# Patient Record
Sex: MALE | Race: WHITE | NOT HISPANIC OR LATINO | Employment: FULL TIME | URBAN - METROPOLITAN AREA
[De-identification: names, ages, dates, MRNs, and addresses within clinical notes are randomized per-mention and may not be internally consistent; named-entity substitution may affect disease eponyms.]

---

## 2021-08-03 ENCOUNTER — HISTORICAL (OUTPATIENT)
Dept: ADMINISTRATIVE | Facility: HOSPITAL | Age: 66
End: 2021-08-03

## 2021-08-03 LAB — SARS-COV-2 RNA RESP QL NAA+PROBE: POSITIVE

## 2021-08-10 ENCOUNTER — HISTORICAL (OUTPATIENT)
Dept: INFECTIOUS DISEASES | Facility: HOSPITAL | Age: 66
End: 2021-08-10

## 2022-04-11 ENCOUNTER — HISTORICAL (OUTPATIENT)
Dept: ADMINISTRATIVE | Facility: HOSPITAL | Age: 67
End: 2022-04-11

## 2022-04-29 VITALS
WEIGHT: 206.81 LBS | SYSTOLIC BLOOD PRESSURE: 176 MMHG | OXYGEN SATURATION: 98 % | HEIGHT: 61 IN | DIASTOLIC BLOOD PRESSURE: 76 MMHG | BODY MASS INDEX: 39.05 KG/M2

## 2022-05-05 NOTE — HISTORICAL [facility]
This is a historical note converted from Lisa. Formatting and pictures may have been removed.  Please reference Lisa for original formatting and attached multimedia. Infectious Diseases Infusion Visit Note  ?  HPI  ?  A 66 year old male seen today in the [facility] infusion clinic for infusion of Casirivimab / Imdevimab. ?Pt met criteria based on age 65 or older and hypertension.  Tested positive for COVID19 on 8/3/2021.  ?  Have discussed the Emergency Use Authorization of Casirivimab / Imdevimab at length with the patient today to include the limitations of authorized use in patients with COVID-19 as well as the potential benefits and the limitations of said benefit. ?Have also discussed the [facility] for the use of the unapproved products is only intended for the treatment of mild to moderate COVID19 in adults with COVID19 who weigh at least 40 kg who are at high risk for progressing to severe COVID19 and/or hospitalization. ?This high risk is defined as:  ?  * Older age (>/= 65)  * BMI > 25  * Pregnancy  * CKD  * DM  * Immunosuppressive Disease or immunosuppressant medications  * Cardiovascular disease (including congenital heart disease) or HTN  * Chronic lung diseases (ex: COPD, Asthma { moderate / severe, interstitial lung disease, cystic fibrosis, and pulmonary HTN)  * Sickle cell disease  * Neurodevelopmental disorders (ex: cerebral palsy) or other complex medical conditions (ex: genetic or metabolic syndromes and severe congenital anomalies)  * Having a medical-related technological dependence (ex: tracheostomy, gastrostomy, or positive pressure ventilation (not related to COVID)  * Other medical conditions or factors that could place an individual for being at high risk leading to progression of disease (additional risk-benefit situations to include, but not limited to, race or ethnicity)?  ?  Have discussed that these drugs must be administered via IV infusion in a supervised setting. ?  ?  Objective:  PE: ?VS  reviewed and stable, as documented in infusion chart  ?? ? ? ?Gen: ?AAOx3 in NAD  ?? ? ? ?HEENT: Atraumatic, normocephalic  ????????CVS: ?RRR no m/r/g noted  ?? ? ? ?C/L: ?Breathing unlabored, Lungs CTA bilateral, equal expansion of the chest wall  ?  Labs: ?Positive COVID 19 test 8/3/2021.  ?  Impression/Recommendations:  ?  COVID19  ?  - Have discussed at length with the patient regarding potential risks vs benefit of Casirivimab / Imdevimab infusion. ?[facility] fact sheet for patients, parents, and caregivers has been provided to pt. ?Alternative therapy reviewed. ?Patient is in agreement to receive infusion, plan for same today. ?Patient given aftercare instructions and number to call should issues or adverse effects arise. ??  ?

## 2022-05-31 ENCOUNTER — HOSPITAL ENCOUNTER (EMERGENCY)
Facility: HOSPITAL | Age: 67
Discharge: HOME | End: 2022-05-31
Attending: INTERNAL MEDICINE
Payer: COMMERCIAL

## 2022-05-31 ENCOUNTER — APPOINTMENT (OUTPATIENT)
Dept: RADIOLOGY | Facility: HOSPITAL | Age: 67
End: 2022-05-31
Attending: INTERNAL MEDICINE
Payer: COMMERCIAL

## 2022-05-31 VITALS
WEIGHT: 201 LBS | TEMPERATURE: 98 F | SYSTOLIC BLOOD PRESSURE: 146 MMHG | DIASTOLIC BLOOD PRESSURE: 65 MMHG | OXYGEN SATURATION: 92 % | RESPIRATION RATE: 18 BRPM | HEART RATE: 80 BPM | HEIGHT: 61 IN | BODY MASS INDEX: 37.95 KG/M2

## 2022-05-31 DIAGNOSIS — S16.1XXA STRAIN OF MUSCLE, FASCIA AND TENDON AT NECK LEVEL, INITIAL ENCOUNTER: ICD-10-CM
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
## 2022-05-31 PROCEDURE — 99284 EMERGENCY DEPT VISIT MOD MDM: CPT | Mod: 25

## 2022-05-31 PROCEDURE — 70450 CT HEAD/BRAIN W/O DYE: CPT | Mod: TC

## 2022-05-31 PROCEDURE — 25000003 PHARM REV CODE 250: Performed by: INTERNAL MEDICINE

## 2022-05-31 RX ORDER — HYDROCODONE BITARTRATE AND ACETAMINOPHEN 7.5; 325 MG/1; MG/1
1 TABLET ORAL EVERY 6 HOURS PRN
Qty: 20 TABLET | Refills: 0 | Status: ON HOLD | OUTPATIENT
Start: 2022-05-31 | End: 2022-10-21 | Stop reason: HOSPADM

## 2022-05-31 RX ORDER — AMLODIPINE BESYLATE 10 MG/1
10 TABLET ORAL DAILY
COMMUNITY
End: 2023-11-01 | Stop reason: SDUPTHER

## 2022-05-31 RX ORDER — LISINOPRIL 40 MG/1
40 TABLET ORAL DAILY
COMMUNITY
End: 2023-11-01 | Stop reason: SDUPTHER

## 2022-05-31 RX ORDER — TIZANIDINE HYDROCHLORIDE 4 MG/1
4 CAPSULE, GELATIN COATED ORAL EVERY 8 HOURS PRN
Qty: 21 CAPSULE | Refills: 0 | Status: ON HOLD | OUTPATIENT
Start: 2022-05-31 | End: 2022-10-21 | Stop reason: CLARIF

## 2022-05-31 RX ORDER — HYDROCODONE BITARTRATE AND ACETAMINOPHEN 7.5; 325 MG/1; MG/1
1 TABLET ORAL ONCE
Status: COMPLETED | OUTPATIENT
Start: 2022-05-31 | End: 2022-05-31

## 2022-05-31 RX ORDER — MELOXICAM 7.5 MG/1
7.5 TABLET ORAL DAILY
COMMUNITY
Start: 2022-04-05 | End: 2023-08-23

## 2022-05-31 RX ORDER — PREDNISONE 20 MG/1
20 TABLET ORAL DAILY
Qty: 5 TABLET | Refills: 0 | Status: SHIPPED | OUTPATIENT
Start: 2022-05-31 | End: 2022-06-05

## 2022-05-31 RX ADMIN — HYDROCODONE BITARTRATE AND ACETAMINOPHEN 1 TABLET: 7.5; 325 TABLET ORAL at 05:05

## 2022-05-31 NOTE — ED TRIAGE NOTES
Pt to er c/o pain between shoulder blades s/p fall. Pt states hit head when he fell, denies loc.   normal/supple

## 2022-05-31 NOTE — Clinical Note
"Arsenio "Reidhans Kang was seen and treated in our emergency department on 5/31/2022.  He may return to work on 06/04/2022.       If you have any questions or concerns, please don't hesitate to call.      Aramis Vasquez, DO"

## 2022-05-31 NOTE — ED PROVIDER NOTES
"     Source of History:  Patient, no limitations    Chief complaint:  Fall (Pt to er c/o pain between shoulder blades s/p fall. Pt states hit head when he fell, denies loc.)      HPI:  Arsenio Kang is a 67 y.o. male presenting with Fall (Pt to er c/o pain between shoulder blades s/p fall. Pt states hit head when he fell, denies loc.)       Patient says he tripped over object, fell forward hitting the top of his head where there is some swelling and erythema, felt "dazed" however no LOC, complains of neck pain, no weakness or tingling    Patient is here for evaluation after a fall. Patient reportedly was standing when he fell from standing. The accident occurred a few minutes ago. It is reported that the patient did not have LOC, was ambulatory on scene and did hit his head and was "dazed" also has neck pain. At this time he complains of neck pain.  Patient denies vomiting, numbness, tingling, incontinence and inability to ambulate. Symptoms are exacerbated by movement. Pre-hospital information: none significant. The patient has tried rest for his symptoms with no relief.         Review of Systems   Constitutional symptoms:  Negative except as documented in HPI.   Skin symptoms:  Negative except as documented in HPI.   Eye symptoms:  Negative except as documented in HPI.   ENMT symptoms:  Negative except as documented in HPI.   Respiratory symptoms:  Negative except as documented in HPI.   Cardiovascular symptoms:  Negative except as documented in HPI.   Gastrointestinal symptoms:  Negative except as documented in HPI.    Genitourinary symptoms:  Negative except as documented in HPI.   Musculoskeletal symptoms:  Negative except as documented in HPI.   Neurologic symptoms:  Negative except as documented in HPI.   Psychiatric symptoms:  Negative except as documented in HPI.   Allergy/immunologic symptoms:  Negative except as documented in HPI.             Additional review of systems information: All other systems " "reviewed and otherwise negative.      Review of patient's allergies indicates:  No Known Allergies    PMH:  As per HPI and below:    History reviewed. No pertinent past medical history.     History reviewed. No pertinent family history.    History reviewed. No pertinent surgical history.    Social History     Tobacco Use    Smoking status: Never Smoker    Smokeless tobacco: Never Used       There is no problem list on file for this patient.       Physical Exam:    BP (!) 146/65   Pulse 80   Temp 98.1 °F (36.7 °C)   Resp 18   Ht 5' 1" (1.549 m)   Wt 91.2 kg (201 lb)   SpO2 (!) 92%   BMI 37.98 kg/m²     Nursing note and vital signs reviewed.    General:  Alert, no acute distress.   Skin: Normal for Ethnic Origin, No cyanosis, contusion top of head  Eye: Vision unchanged, Pupils symmetric, No icterus   Cardiovascular:  Regular rate and rhythm, No edema  Chest Wall: No deformity, equal chest rise  Respiratory:  Lungs are clear to auscultation, respirations are non-labored.    Musculoskeletal:  No deformity, Normal perfusion to all extremities  Back: Painful ROM in neck and upper back area, questionable bony tenderness in lower c-spine  : No suprapubic pain, No CVA tenderness  Gastrointestinal:  Soft, Nontender, Non distended, Normal bowel sounds.    Neurological:  Alert and oriented to person, place, time, and situation, normal motor observed, normal speech observed, strength equal bilateral upper extremities, no sensory changes, ambulates without difficulty  Psychiatric:  Cooperative, appropriate mood & affect.        Labs that have been ordered have been independently reviewed and interpreted by myself.     Old Chart Reviewed.      Initial Impression/ Differential Dx:      MDM:      Reviewed Nurses Note.    Reviewed Pertinent old records.    Orders Placed This Encounter    CT Head Without Contrast    CT Cervical Spine Without Contrast    X-Ray Chest PA And Lateral    HYDROcodone-acetaminophen 7.5-325 mg " per tablet 1 tablet    predniSONE (DELTASONE) 20 MG tablet    HYDROcodone-acetaminophen (NORCO) 7.5-325 mg per tablet    tiZANidine 4 mg Cap                    Labs Reviewed - No data to display       CT Head Without Contrast   Final Result         1. No acute intracranial abnormality.   2. Age-related atrophy and chronic sequela of white matter microvascular disease.   3. Additional chronic secondary details discussed above.         Electronically signed by: Jaylen Razo   Date:    05/31/2022   Time:    16:41      CT Cervical Spine Without Contrast   Final Result      No acute fracture or malalignment identified.      Cervical spine degenerative disc disease and spondylosis level by level discussed above.         Electronically signed by: Chris Rocha   Date:    05/31/2022   Time:    16:35      X-Ray Chest PA And Lateral   Final Result      No acute cardiopulmonary process.         Electronically signed by: Janes Bermudez   Date:    05/31/2022   Time:    16:32           No visits with results within 1 Day(s) from this visit.   Latest known visit with results is:   No results found for any previous visit.       Imaging Results          CT Head Without Contrast (Final result)  Result time 05/31/22 16:41:31    Final result by Jaylen Razo MD (05/31/22 16:41:31)                 Impression:        1. No acute intracranial abnormality.  2. Age-related atrophy and chronic sequela of white matter microvascular disease.  3. Additional chronic secondary details discussed above.      Electronically signed by: Jaylen Razo  Date:    05/31/2022  Time:    16:41             Narrative:    EXAMINATION:  CT HEAD WITHOUT CONTRAST    CLINICAL HISTORY:  ; Head trauma, moderate-severe;    TECHNIQUE:  Axial CT images were acquired without the intravenous administration of iodine-based contrast media.  Multiplanar reconstructions were accomplished by a CT technologist at a separate workstation and pushed to PACS for physician  review.    Automated tube current modulation, weight-based exposure dosing, and/or iterative reconstruction technique utilized to reach lowest reasonably achievable exposure rate.    DLP: 902 mGy*cm    COMPARISON:  None available at the time of the initial interpretation.    FINDINGS:  Images were reviewed in subdural, brain, soft tissue, and bone windows.    Exam quality: Motion/streak artifact limits assessment of the posterior fossa.    Hemorrhage:No evidence of acute hyperattenuating blood products.    Parenchyma: There is diffuse white matter hypoattenuation, typical of chronic microvascular changes. No discrete mass, mass effect, or CT evidence of acute large vascular territory insult. Gray-white differentiation is preserved.    Midline shift: None.    Ventricles: Normal size and configuration. No hydrocephalus.    Extra-axial spaces: No masses or fluid collections.    Dural sinuses: No abnormal densities.    Sellar/Suprasellar structures: No abnormalities.    Skull base: Mastoid air cells are well aerated. No focal abnormality.    Scalp/Skull: No abnormalities.    Vasculature: No focally hyperdense artery. Scattered vascular calcifications are present.    Facial structures: Unremarkable.                                 CT Cervical Spine Without Contrast (Final result)  Result time 05/31/22 16:35:11    Final result by Chris Rocha MD (05/31/22 16:35:11)                 Impression:      No acute fracture or malalignment identified.    Cervical spine degenerative disc disease and spondylosis level by level discussed above.      Electronically signed by: Chris Rocha  Date:    05/31/2022  Time:    16:35             Narrative:    EXAMINATION:  CT CERVICAL SPINE WITHOUT CONTRAST    CLINICAL HISTORY:  Neck pain, recent trauma;    TECHNIQUE:  Sequential axial images were obtained of the cervical spine without contrast and the images were reformatted.    Dose length product was 269 mGycm. Approximated exposure  control was utilized to minimize radiation dose.    COMPARISON:  None available    FINDINGS:  Cervical vertebrae stature is preserved and alignment is unremarkable.  No acute fracture or malalignment identified.  There is no prevertebral soft tissue prominence.  Disc segmental analysis is given below:    At C2-C3, there is mild posterior vertebral spondylosis without central canal stenosis.  There is bilateral uncovertebral arthropathy without significant narrowings of the neural foramen.    At C3-C4, there is osteophyte disc complex which effaces the ventral subarachnoid space without cord compression.  There is bilateral uncovertebral arthropathy which results in bilateral marked narrowings of the neural foramen which is worse on the left.    At C4-C5, there is osteophyte disc complex which effaces the ventral subarachnoid space without apparent cord compression.  There is bilateral uncovertebral and to a lesser degree facet arthropathy.  This results in bilateral marked narrowings of the neural foramen.    At C5-C6, there is osteophyte disc complex which indents the ventral thecal sac without cord compression.  There are bilateral mild spondylotic narrowings of the neural foramen.    At C6-C7, there is osteophyte disc complex which indents the ventral thecal sac without cord compression.  There is mild narrowing the right neural foramen and moderate spondylotic narrowing of the left neural foramen.    At C7-T1, central canal is not stenosed.  There are no narrowings of the neural foramen.                               X-Ray Chest PA And Lateral (Final result)  Result time 05/31/22 16:32:17    Final result by Janes Bermudez MD (05/31/22 16:32:17)                 Impression:      No acute cardiopulmonary process.      Electronically signed by: Janes Bermudez  Date:    05/31/2022  Time:    16:32             Narrative:    EXAMINATION:  XR CHEST PA AND LATERAL    CLINICAL HISTORY:  fall;    TECHNIQUE:  PA and lateral views  of the chest.    COMPARISON:  None available.    FINDINGS:  The cardiomediastinal silhouette is normal in size and contour. Pulmonary vascularity is within normal limits. The lungs are well-inflated and are without focal consolidation, pleural effusion, or pneumothorax.    The imaged osseous structures and soft tissues are without acute abnormality.                                                                     Diagnostic Impression:    1. Fall from ground level    2. Strain of muscle, fascia and tendon at neck level, initial encounter         ED Disposition Condition    Discharge Stable           Follow-up Information     PCP In 1 week.                        ED Prescriptions     Medication Sig Dispense Start Date End Date Auth. Provider    predniSONE (DELTASONE) 20 MG tablet Take 1 tablet (20 mg total) by mouth once daily. for 5 days 5 tablet 5/31/2022 6/5/2022 Aramis Vasquez, DO    HYDROcodone-acetaminophen (NORCO) 7.5-325 mg per tablet Take 1 tablet by mouth every 6 (six) hours as needed for Pain. 20 tablet 5/31/2022  Aramis Vasquez DO    tiZANidine 4 mg Cap Take 4 mg by mouth every 8 (eight) hours as needed (muscle spasm). 21 capsule 5/31/2022  Aramis Vasquez DO        Follow-up Information     Follow up With Specialties Details Why Contact Info    PCP  In 1 week             Aramis Vasquez DO  05/31/22 0655

## 2022-09-22 DIAGNOSIS — M77.8 RIGHT SHOULDER TENDONITIS: Primary | ICD-10-CM

## 2022-09-26 ENCOUNTER — OFFICE VISIT (OUTPATIENT)
Dept: ORTHOPEDICS | Facility: CLINIC | Age: 67
End: 2022-09-26
Payer: COMMERCIAL

## 2022-09-26 ENCOUNTER — HOSPITAL ENCOUNTER (OUTPATIENT)
Dept: RADIOLOGY | Facility: CLINIC | Age: 67
Discharge: HOME | End: 2022-09-26
Attending: REHABILITATION UNIT
Payer: COMMERCIAL

## 2022-09-26 VITALS
WEIGHT: 197.19 LBS | DIASTOLIC BLOOD PRESSURE: 76 MMHG | HEIGHT: 61 IN | BODY MASS INDEX: 37.23 KG/M2 | HEART RATE: 82 BPM | SYSTOLIC BLOOD PRESSURE: 166 MMHG

## 2022-09-26 DIAGNOSIS — M75.121 COMPLETE TEAR OF RIGHT ROTATOR CUFF, UNSPECIFIED WHETHER TRAUMATIC: Primary | ICD-10-CM

## 2022-09-26 DIAGNOSIS — M25.511 RIGHT SHOULDER PAIN, UNSPECIFIED CHRONICITY: ICD-10-CM

## 2022-09-26 DIAGNOSIS — M77.8 RIGHT SHOULDER TENDONITIS: ICD-10-CM

## 2022-09-26 PROCEDURE — 1159F PR MEDICATION LIST DOCUMENTED IN MEDICAL RECORD: ICD-10-PCS | Mod: CPTII,,, | Performed by: REHABILITATION UNIT

## 2022-09-26 PROCEDURE — 73030 XR SHOULDER COMPLETE 2 OR MORE VIEWS RIGHT: ICD-10-PCS | Mod: RT,,, | Performed by: REHABILITATION UNIT

## 2022-09-26 PROCEDURE — 99214 PR OFFICE/OUTPT VISIT, EST, LEVL IV, 30-39 MIN: ICD-10-PCS | Mod: ,,, | Performed by: REHABILITATION UNIT

## 2022-09-26 PROCEDURE — 99214 OFFICE O/P EST MOD 30 MIN: CPT | Mod: ,,, | Performed by: REHABILITATION UNIT

## 2022-09-26 PROCEDURE — 3078F PR MOST RECENT DIASTOLIC BLOOD PRESSURE < 80 MM HG: ICD-10-PCS | Mod: CPTII,,, | Performed by: REHABILITATION UNIT

## 2022-09-26 PROCEDURE — 3008F BODY MASS INDEX DOCD: CPT | Mod: CPTII,,, | Performed by: REHABILITATION UNIT

## 2022-09-26 PROCEDURE — 73030 X-RAY EXAM OF SHOULDER: CPT | Mod: RT,,, | Performed by: REHABILITATION UNIT

## 2022-09-26 PROCEDURE — 1125F PR PAIN SEVERITY QUANTIFIED, PAIN PRESENT: ICD-10-PCS | Mod: CPTII,,, | Performed by: REHABILITATION UNIT

## 2022-09-26 PROCEDURE — 3077F PR MOST RECENT SYSTOLIC BLOOD PRESSURE >= 140 MM HG: ICD-10-PCS | Mod: CPTII,,, | Performed by: REHABILITATION UNIT

## 2022-09-26 PROCEDURE — 3288F PR FALLS RISK ASSESSMENT DOCUMENTED: ICD-10-PCS | Mod: CPTII,,, | Performed by: REHABILITATION UNIT

## 2022-09-26 PROCEDURE — 1101F PT FALLS ASSESS-DOCD LE1/YR: CPT | Mod: CPTII,,, | Performed by: REHABILITATION UNIT

## 2022-09-26 PROCEDURE — 1159F MED LIST DOCD IN RCRD: CPT | Mod: CPTII,,, | Performed by: REHABILITATION UNIT

## 2022-09-26 PROCEDURE — 3008F PR BODY MASS INDEX (BMI) DOCUMENTED: ICD-10-PCS | Mod: CPTII,,, | Performed by: REHABILITATION UNIT

## 2022-09-26 PROCEDURE — 1101F PR PT FALLS ASSESS DOC 0-1 FALLS W/OUT INJ PAST YR: ICD-10-PCS | Mod: CPTII,,, | Performed by: REHABILITATION UNIT

## 2022-09-26 PROCEDURE — 3078F DIAST BP <80 MM HG: CPT | Mod: CPTII,,, | Performed by: REHABILITATION UNIT

## 2022-09-26 PROCEDURE — 3288F FALL RISK ASSESSMENT DOCD: CPT | Mod: CPTII,,, | Performed by: REHABILITATION UNIT

## 2022-09-26 PROCEDURE — 1125F AMNT PAIN NOTED PAIN PRSNT: CPT | Mod: CPTII,,, | Performed by: REHABILITATION UNIT

## 2022-09-26 PROCEDURE — 3077F SYST BP >= 140 MM HG: CPT | Mod: CPTII,,, | Performed by: REHABILITATION UNIT

## 2022-09-26 RX ORDER — DICLOFENAC SODIUM 75 MG/1
75 TABLET, DELAYED RELEASE ORAL 2 TIMES DAILY
Status: ON HOLD | COMMUNITY
Start: 2022-08-17 | End: 2022-10-21 | Stop reason: HOSPADM

## 2022-09-26 NOTE — PROGRESS NOTES
Subjective:      Patient ID: Arsenio Kang is a 67 y.o. male.    Chief Complaint: Shoulder Pain (Pt states he has multiple tears in his shoulder, pt was diagnosed w/ tendonitis. Pt needs a second opinion of a specialist. Pt is current taking Norco.)    HPI:   Arsenio Kang is a 67 y.o. male who presents today for initial evaluation of his right shoulder.  He is right-hand dominant.  He reports that last month he developed severe right shoulder pain.  Prior to this he reports that he had occasional issues with bursitis but these were temporary and responded to medication.  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  He reports that he was manually moving some heavy weighted pallets very frequently and he had developed shoulder pain at work with these activities.  He has tried Aleve, diclofenac, and is Norco with minimal relief.  Pain is localized to the lateral aspect of the shoulder.  It is worse with certain movements such as overhead activities and associated with loss of motion and some weakness.  He also reports a numbness and tingling that extends down into the dorsal aspect of his forearm and hand. PCP ordered MRI and he is here to discuss further treatment options.       Past Medical History:   Diagnosis Date    Arthritis     Bursitis     Heart murmur     Hypertension      Past Surgical History:   Procedure Laterality Date    APPENDECTOMY      BACK SURGERY       Social History     Socioeconomic History    Marital status: Unknown   Tobacco Use    Smoking status: Never    Smokeless tobacco: Never         Current Outpatient Medications:     amLODIPine (NORVASC) 10 MG tablet, Take 10 mg by mouth once daily., Disp: , Rfl:     diclofenac (VOLTAREN) 75 MG EC tablet, Take 75 mg by mouth 2 (two) times daily., Disp: , Rfl:     HYDROcodone-acetaminophen (NORCO) 7.5-325 mg per tablet, Take 1 tablet by mouth every 6 (six) hours as needed for Pain., Disp: 20 tablet, Rfl: 0    lisinopriL  "(PRINIVIL,ZESTRIL) 40 MG tablet, Take 40 mg by mouth once daily., Disp: , Rfl:     meloxicam (MOBIC) 7.5 MG tablet, Take 7.5 mg by mouth once daily., Disp: , Rfl:     tiZANidine 4 mg Cap, Take 4 mg by mouth every 8 (eight) hours as needed (muscle spasm). (Patient not taking: Reported on 9/26/2022), Disp: 21 capsule, Rfl: 0  Review of patient's allergies indicates:  No Known Allergies    BP (!) 166/76   Pulse 82   Ht 5' 1" (1.549 m)   Wt 89.4 kg (197 lb 3.2 oz)   BMI 37.26 kg/m²     Comprehensive review of systems completed and negative except as per HPI.        Objective:   Head: Normocephalic, without obvious abnormality, atraumatic  Eyes: conjunctivae/corneas clear. EOM's intact  Ears: normal external appearance  Nose: Nares normal. Septum midline. Mucosa normal. No drainage  Throat: normal findings: lips normal without lesions  Lungs: unlabored breathing on room air  Chest wall: symmetric chest rise  Heart: regular rate and rhythm  Pulses: 2+ and symmetric  Skin: Skin color, texture, turgor normal. No rashes or lesions  Neurologic: Grossly normal    right SHOULDER    Appearance:   normal    Cervical Spine:   No ttp; full, painless ROM; negative Spurlings    Tenderness:   Diffuse     AROM:   , Abduction 120, ER 60, IR back pocket    PROM:  Same and limited by pain    Pain:  AROM: Positive  PROM:  Positive  End ROM: Positive  Supraspinatus strength testing: Positive  External rotation strength testing: Positive  Kaya-scapular: Negative  Virtually all provacative maneuvers Positive    Strength:  Supraspinatus: intact  External rotation: intact  Kaya-scapular: intact    Provocative Maneuvers:     Rotator Cuff/Biceps/AC Joint  Neer's Sign: Positive  Hawkin's Test: Positive  Painful arc: Positive  Belly Press: Negative  Bear Hugger Test: Negative  Hornblower's Sign: Negative  Speed's Test: Positive  Cross Arm Abduction: Positive    Pulses: Palpable radial pulse    Neurological deficits: None    The patient " has a warm and well-perfused upper extremity with capillary refill less than 2 seconds. Sensation is intact to light touch in terminal nerve distributions. 5/5 ain/pin/uln. The patient has no palpable epitrochlear lymphadenopathy.      Assessment:     Imaging:   Four view radiographs of the right shoulder obtained today personally reviewed showing no acute fractures or dislocations.  Mild AC and GH arthritis.  Humeral head remains seated centrally within the glenoid.    MRI R shoulder w/o 9/21/22    Impression:    1. Subacromial, subdeltoid, and subcoracoid bursal fluid  2. Complete supraspinatus tendon tear with tendon retraction to a medial humeral head level  3. Thin lateral subscapularis tendon.  Partial tearing is most likely.  This is difficult to characterize.    Number for infraspinatus tendinosis.    5. Partial, possibly complete, bicipital tendon tear.    6. Biceps labral complex degeneration versus partial tearing.  Labral degenerative changes.    7. Severe AC joint degenerative disease.  Mild-to-moderate glenohumeral joint degenerative disease.    MRI of the right shoulder personally reviewed showing full-thickness supraspinatus tear with retraction to the medial humeral head.  Tendinosis of the infraspinatus.  Bursitis.  Biceps tendinopathy.  Degenerative labrum.  Advanced AC joint arthritis.  Mild glenohumeral arthritis.        1. Complete tear of right rotator cuff, unspecified whether traumatic    2. Right shoulder pain, unspecified chronicity    3. Right shoulder tendonitis          Plan:       Orders Placed This Encounter    X-ray Shoulder 2 or More Views Right    X-Ray Chest PA And Lateral    CBC auto differential    Comprehensive metabolic panel    EKG 12-lead    Case Request Operating Room: REPAIR, ROTATOR CUFF, ARTHROSCOPIC      Patient has had previous right shoulder pain in the past that has flared up and ultimately got better medication and time.  He had an incident at work with an increase  in pain that has not improved with nonoperative modalities.  MRI results as above with full-thickness rotator cuff tear.  He has had persistent pain and loss of function that is interfering with his activities of daily living and his work. We discussed operative and nonoperative options. The patient had an opportunity to ask questions. All questions were answered. The risks and benefits of surgery were discussed with the patient, including but not limited to bleeding, infection, damage to surrounding nerves and structures, need for further surgery, repair failure, anesthesia risk, progression of arthritis, blood clots, and medical risks of surgery. The patient voiced understanding of the risks and benefits and provided written consent to the procedure. Plan for right shoulder arthroscopy with rotator cuff repair, biceps tenotomy versus tenodesis, and any indicated procedures. Patient will be scheduled for surgery at a mutually convenient date in the near future. He was fitted for his postoperative sling.

## 2022-10-03 ENCOUNTER — TELEPHONE (OUTPATIENT)
Dept: ORTHOPEDICS | Facility: CLINIC | Age: 67
End: 2022-10-03
Payer: COMMERCIAL

## 2022-10-09 RX ORDER — SODIUM CHLORIDE 9 MG/ML
INJECTION, SOLUTION INTRAVENOUS CONTINUOUS
Status: CANCELLED | OUTPATIENT
Start: 2022-10-09

## 2022-10-18 ENCOUNTER — ANESTHESIA EVENT (OUTPATIENT)
Dept: SURGERY | Facility: HOSPITAL | Age: 67
End: 2022-10-18
Payer: COMMERCIAL

## 2022-10-19 ENCOUNTER — TELEPHONE (OUTPATIENT)
Dept: PREADMISSION TESTING | Facility: HOSPITAL | Age: 67
End: 2022-10-19

## 2022-10-21 ENCOUNTER — ANESTHESIA (OUTPATIENT)
Dept: SURGERY | Facility: HOSPITAL | Age: 67
End: 2022-10-21
Payer: COMMERCIAL

## 2022-10-21 ENCOUNTER — HOSPITAL ENCOUNTER (OUTPATIENT)
Facility: HOSPITAL | Age: 67
Discharge: HOME | End: 2022-10-21
Attending: REHABILITATION UNIT | Admitting: REHABILITATION UNIT
Payer: COMMERCIAL

## 2022-10-21 VITALS
RESPIRATION RATE: 19 BRPM | HEART RATE: 99 BPM | SYSTOLIC BLOOD PRESSURE: 121 MMHG | WEIGHT: 206.13 LBS | OXYGEN SATURATION: 95 % | BODY MASS INDEX: 38.92 KG/M2 | HEIGHT: 61 IN | TEMPERATURE: 98 F | DIASTOLIC BLOOD PRESSURE: 88 MMHG

## 2022-10-21 DIAGNOSIS — M75.121 COMPLETE TEAR OF RIGHT ROTATOR CUFF, UNSPECIFIED WHETHER TRAUMATIC: ICD-10-CM

## 2022-10-21 DIAGNOSIS — M25.511 RIGHT SHOULDER PAIN, UNSPECIFIED CHRONICITY: ICD-10-CM

## 2022-10-21 DIAGNOSIS — Z41.9 SURGERY, ELECTIVE: ICD-10-CM

## 2022-10-21 LAB
ANION GAP SERPL CALC-SCNC: 9 MEQ/L
BASOPHILS # BLD AUTO: 0.05 X10(3)/MCL (ref 0–0.2)
BASOPHILS NFR BLD AUTO: 0.7 %
BUN SERPL-MCNC: 16.4 MG/DL (ref 8.4–25.7)
CALCIUM SERPL-MCNC: 10 MG/DL (ref 8.8–10)
CHLORIDE SERPL-SCNC: 105 MMOL/L (ref 98–107)
CO2 SERPL-SCNC: 24 MMOL/L (ref 23–31)
CREAT SERPL-MCNC: 0.83 MG/DL (ref 0.73–1.18)
CREAT/UREA NIT SERPL: 20
EOSINOPHIL # BLD AUTO: 0.24 X10(3)/MCL (ref 0–0.9)
EOSINOPHIL NFR BLD AUTO: 3.5 %
ERYTHROCYTE [DISTWIDTH] IN BLOOD BY AUTOMATED COUNT: 13.8 % (ref 11.5–17)
GFR SERPLBLD CREATININE-BSD FMLA CKD-EPI: >60 MLS/MIN/1.73/M2
GLUCOSE SERPL-MCNC: 94 MG/DL (ref 82–115)
HCT VFR BLD AUTO: 42.8 % (ref 42–52)
HGB BLD-MCNC: 13.9 GM/DL (ref 14–18)
IMM GRANULOCYTES # BLD AUTO: 0.02 X10(3)/MCL (ref 0–0.04)
IMM GRANULOCYTES NFR BLD AUTO: 0.3 %
LYMPHOCYTES # BLD AUTO: 2.9 X10(3)/MCL (ref 0.6–4.6)
LYMPHOCYTES NFR BLD AUTO: 42.5 %
MCH RBC QN AUTO: 27.6 PG (ref 27–31)
MCHC RBC AUTO-ENTMCNC: 32.5 MG/DL (ref 33–36)
MCV RBC AUTO: 84.9 FL (ref 80–94)
MONOCYTES # BLD AUTO: 0.69 X10(3)/MCL (ref 0.1–1.3)
MONOCYTES NFR BLD AUTO: 10.1 %
NEUTROPHILS # BLD AUTO: 2.9 X10(3)/MCL (ref 2.1–9.2)
NEUTROPHILS NFR BLD AUTO: 42.9 %
NRBC BLD AUTO-RTO: 0 %
PLATELET # BLD AUTO: 281 X10(3)/MCL (ref 130–400)
PMV BLD AUTO: 9.6 FL (ref 7.4–10.4)
POTASSIUM SERPL-SCNC: 4.1 MMOL/L (ref 3.5–5.1)
RBC # BLD AUTO: 5.04 X10(6)/MCL (ref 4.7–6.1)
SODIUM SERPL-SCNC: 138 MMOL/L (ref 136–145)
WBC # SPEC AUTO: 6.8 X10(3)/MCL (ref 4.5–11.5)

## 2022-10-21 PROCEDURE — 36415 COLL VENOUS BLD VENIPUNCTURE: CPT | Performed by: REHABILITATION UNIT

## 2022-10-21 PROCEDURE — 25000003 PHARM REV CODE 250: Performed by: NURSE ANESTHETIST, CERTIFIED REGISTERED

## 2022-10-21 PROCEDURE — C1713 ANCHOR/SCREW BN/BN,TIS/BN: HCPCS | Performed by: REHABILITATION UNIT

## 2022-10-21 PROCEDURE — 71000016 HC POSTOP RECOV ADDL HR: Performed by: REHABILITATION UNIT

## 2022-10-21 PROCEDURE — 71000033 HC RECOVERY, INTIAL HOUR: Performed by: REHABILITATION UNIT

## 2022-10-21 PROCEDURE — 80048 BASIC METABOLIC PNL TOTAL CA: CPT | Performed by: REHABILITATION UNIT

## 2022-10-21 PROCEDURE — C1889 IMPLANT/INSERT DEVICE, NOC: HCPCS | Performed by: REHABILITATION UNIT

## 2022-10-21 PROCEDURE — 27201423 OPTIME MED/SURG SUP & DEVICES STERILE SUPPLY: Performed by: REHABILITATION UNIT

## 2022-10-21 PROCEDURE — 37000008 HC ANESTHESIA 1ST 15 MINUTES: Performed by: REHABILITATION UNIT

## 2022-10-21 PROCEDURE — 25000003 PHARM REV CODE 250: Performed by: REHABILITATION UNIT

## 2022-10-21 PROCEDURE — 63600175 PHARM REV CODE 636 W HCPCS: Performed by: REHABILITATION UNIT

## 2022-10-21 PROCEDURE — 29823 SHO ARTHRS SRG XTNSV DBRDMT: CPT | Mod: 59,51,RT, | Performed by: REHABILITATION UNIT

## 2022-10-21 PROCEDURE — 63600175 PHARM REV CODE 636 W HCPCS: Performed by: STUDENT IN AN ORGANIZED HEALTH CARE EDUCATION/TRAINING PROGRAM

## 2022-10-21 PROCEDURE — 94640 AIRWAY INHALATION TREATMENT: CPT

## 2022-10-21 PROCEDURE — 37000009 HC ANESTHESIA EA ADD 15 MINS: Performed by: REHABILITATION UNIT

## 2022-10-21 PROCEDURE — 93005 ELECTROCARDIOGRAM TRACING: CPT | Mod: 59

## 2022-10-21 PROCEDURE — 36000711: Performed by: REHABILITATION UNIT

## 2022-10-21 PROCEDURE — 85025 COMPLETE CBC W/AUTO DIFF WBC: CPT | Performed by: REHABILITATION UNIT

## 2022-10-21 PROCEDURE — 93010 ELECTROCARDIOGRAM REPORT: CPT | Mod: ,,, | Performed by: INTERNAL MEDICINE

## 2022-10-21 PROCEDURE — 29827 SHO ARTHRS SRG RT8TR CUF RPR: CPT | Mod: RT,,, | Performed by: REHABILITATION UNIT

## 2022-10-21 PROCEDURE — 93010 EKG 12-LEAD: ICD-10-PCS | Mod: ,,, | Performed by: INTERNAL MEDICINE

## 2022-10-21 PROCEDURE — 94761 N-INVAS EAR/PLS OXIMETRY MLT: CPT

## 2022-10-21 PROCEDURE — 29827 PR SHLDR ARTHROSCOP,SURG,W/ROTAT CUFF REPR: ICD-10-PCS | Mod: RT,,, | Performed by: REHABILITATION UNIT

## 2022-10-21 PROCEDURE — 76942 ECHO GUIDE FOR BIOPSY: CPT | Performed by: STUDENT IN AN ORGANIZED HEALTH CARE EDUCATION/TRAINING PROGRAM

## 2022-10-21 PROCEDURE — 63600175 PHARM REV CODE 636 W HCPCS: Performed by: NURSE ANESTHETIST, CERTIFIED REGISTERED

## 2022-10-21 PROCEDURE — 36000710: Performed by: REHABILITATION UNIT

## 2022-10-21 PROCEDURE — 29823 PR SHLDR ARTHROSCOP,EXTEN DEBRIDE: ICD-10-PCS | Mod: 59,51,RT, | Performed by: REHABILITATION UNIT

## 2022-10-21 PROCEDURE — 71000015 HC POSTOP RECOV 1ST HR: Performed by: REHABILITATION UNIT

## 2022-10-21 PROCEDURE — 25000242 PHARM REV CODE 250 ALT 637 W/ HCPCS: Performed by: STUDENT IN AN ORGANIZED HEALTH CARE EDUCATION/TRAINING PROGRAM

## 2022-10-21 PROCEDURE — 27000221 HC OXYGEN, UP TO 24 HOURS

## 2022-10-21 DEVICE — BIO-COMPOSITE CRKSCRW 5.5X14.7MM
Type: IMPLANTABLE DEVICE | Site: SHOULDER | Status: FUNCTIONAL
Brand: ARTHREX®

## 2022-10-21 RX ORDER — MELOXICAM 7.5 MG/1
TABLET ORAL
Qty: 30 TABLET | Refills: 0 | Status: SHIPPED | OUTPATIENT
Start: 2022-10-27 | End: 2022-11-05

## 2022-10-21 RX ORDER — ONDANSETRON 2 MG/ML
4 INJECTION INTRAMUSCULAR; INTRAVENOUS EVERY 6 HOURS PRN
Status: DISCONTINUED | OUTPATIENT
Start: 2022-10-21 | End: 2022-10-21 | Stop reason: HOSPADM

## 2022-10-21 RX ORDER — PROPOFOL 10 MG/ML
VIAL (ML) INTRAVENOUS
Status: DISCONTINUED | OUTPATIENT
Start: 2022-10-21 | End: 2022-10-21

## 2022-10-21 RX ORDER — OXYCODONE HYDROCHLORIDE 5 MG/1
5 TABLET ORAL EVERY 12 HOURS PRN
Qty: 14 TABLET | Refills: 0 | Status: SHIPPED | OUTPATIENT
Start: 2022-10-21 | End: 2022-10-28

## 2022-10-21 RX ORDER — IPRATROPIUM BROMIDE AND ALBUTEROL SULFATE 2.5; .5 MG/3ML; MG/3ML
3 SOLUTION RESPIRATORY (INHALATION) ONCE
Status: COMPLETED | OUTPATIENT
Start: 2022-10-21 | End: 2022-10-21

## 2022-10-21 RX ORDER — KETOROLAC TROMETHAMINE 10 MG/1
10 TABLET, FILM COATED ORAL EVERY 6 HOURS
Qty: 20 TABLET | Refills: 0 | Status: SHIPPED | OUTPATIENT
Start: 2022-10-21 | End: 2022-10-26

## 2022-10-21 RX ORDER — DEXAMETHASONE SODIUM PHOSPHATE 4 MG/ML
INJECTION, SOLUTION INTRA-ARTICULAR; INTRALESIONAL; INTRAMUSCULAR; INTRAVENOUS; SOFT TISSUE
Status: DISCONTINUED | OUTPATIENT
Start: 2022-10-21 | End: 2022-10-21

## 2022-10-21 RX ORDER — METHOCARBAMOL 750 MG/1
750 TABLET, FILM COATED ORAL EVERY 6 HOURS
Qty: 56 TABLET | Refills: 0 | Status: SHIPPED | OUTPATIENT
Start: 2022-10-21 | End: 2022-11-04

## 2022-10-21 RX ORDER — SODIUM CHLORIDE 9 MG/ML
INJECTION, SOLUTION INTRAVENOUS CONTINUOUS
Status: DISCONTINUED | OUTPATIENT
Start: 2022-10-21 | End: 2022-10-21 | Stop reason: HOSPADM

## 2022-10-21 RX ORDER — EPINEPHRINE 1 MG/ML
INJECTION, SOLUTION, CONCENTRATE INTRAVENOUS
Status: DISCONTINUED
Start: 2022-10-21 | End: 2022-10-21 | Stop reason: HOSPADM

## 2022-10-21 RX ORDER — ROCURONIUM BROMIDE 10 MG/ML
INJECTION, SOLUTION INTRAVENOUS
Status: DISCONTINUED | OUTPATIENT
Start: 2022-10-21 | End: 2022-10-21

## 2022-10-21 RX ORDER — HYDROCODONE BITARTRATE AND ACETAMINOPHEN 5; 325 MG/1; MG/1
1 TABLET ORAL EVERY 4 HOURS PRN
Status: DISCONTINUED | OUTPATIENT
Start: 2022-10-21 | End: 2022-10-21 | Stop reason: HOSPADM

## 2022-10-21 RX ORDER — METOCLOPRAMIDE HYDROCHLORIDE 5 MG/ML
10 INJECTION INTRAMUSCULAR; INTRAVENOUS EVERY 6 HOURS PRN
Status: DISCONTINUED | OUTPATIENT
Start: 2022-10-21 | End: 2022-10-21 | Stop reason: HOSPADM

## 2022-10-21 RX ORDER — FENTANYL CITRATE 50 UG/ML
INJECTION, SOLUTION INTRAMUSCULAR; INTRAVENOUS
Status: DISCONTINUED | OUTPATIENT
Start: 2022-10-21 | End: 2022-10-21

## 2022-10-21 RX ORDER — MIDAZOLAM HYDROCHLORIDE 1 MG/ML
INJECTION INTRAMUSCULAR; INTRAVENOUS
Status: DISCONTINUED
Start: 2022-10-21 | End: 2022-10-21 | Stop reason: HOSPADM

## 2022-10-21 RX ORDER — MORPHINE SULFATE 4 MG/ML
3 INJECTION, SOLUTION INTRAMUSCULAR; INTRAVENOUS
Status: DISCONTINUED | OUTPATIENT
Start: 2022-10-21 | End: 2022-10-21 | Stop reason: HOSPADM

## 2022-10-21 RX ORDER — EPINEPHRINE 1 MG/ML
INJECTION INTRAMUSCULAR; INTRAVENOUS; SUBCUTANEOUS
Status: DISCONTINUED | OUTPATIENT
Start: 2022-10-21 | End: 2022-10-21 | Stop reason: HOSPADM

## 2022-10-21 RX ORDER — ASPIRIN 81 MG/1
81 TABLET ORAL DAILY
COMMUNITY

## 2022-10-21 RX ORDER — ONDANSETRON 2 MG/ML
INJECTION INTRAMUSCULAR; INTRAVENOUS
Status: DISCONTINUED | OUTPATIENT
Start: 2022-10-21 | End: 2022-10-21

## 2022-10-21 RX ORDER — PHENYLEPHRINE HYDROCHLORIDE 10 MG/ML
INJECTION INTRAVENOUS CONTINUOUS PRN
Status: DISCONTINUED | OUTPATIENT
Start: 2022-10-21 | End: 2022-10-21

## 2022-10-21 RX ORDER — ROPIVACAINE HYDROCHLORIDE 5 MG/ML
INJECTION, SOLUTION EPIDURAL; INFILTRATION; PERINEURAL
Status: COMPLETED
Start: 2022-10-21 | End: 2022-10-21

## 2022-10-21 RX ORDER — ONDANSETRON 4 MG/1
4 TABLET, ORALLY DISINTEGRATING ORAL EVERY 6 HOURS PRN
Qty: 10 TABLET | Refills: 0 | Status: SHIPPED | OUTPATIENT
Start: 2022-10-21 | End: 2022-10-31

## 2022-10-21 RX ORDER — METHOCARBAMOL 500 MG/1
1000 TABLET, FILM COATED ORAL EVERY 6 HOURS PRN
Status: DISCONTINUED | OUTPATIENT
Start: 2022-10-21 | End: 2022-10-21 | Stop reason: HOSPADM

## 2022-10-21 RX ORDER — SODIUM CHLORIDE 0.9 G/100ML
IRRIGANT IRRIGATION
Status: DISCONTINUED | OUTPATIENT
Start: 2022-10-21 | End: 2022-10-21 | Stop reason: HOSPADM

## 2022-10-21 RX ORDER — MAG HYDROX/ALUMINUM HYD/SIMETH 200-200-20
30 SUSPENSION, ORAL (FINAL DOSE FORM) ORAL
Status: DISCONTINUED | OUTPATIENT
Start: 2022-10-21 | End: 2022-10-21 | Stop reason: HOSPADM

## 2022-10-21 RX ORDER — GLYCOPYRROLATE 0.2 MG/ML
INJECTION INTRAMUSCULAR; INTRAVENOUS
Status: DISCONTINUED | OUTPATIENT
Start: 2022-10-21 | End: 2022-10-21

## 2022-10-21 RX ORDER — CEFAZOLIN SODIUM 2 G/100ML
2 INJECTION, SOLUTION INTRAVENOUS
Status: DISCONTINUED | OUTPATIENT
Start: 2022-10-21 | End: 2022-10-21 | Stop reason: HOSPADM

## 2022-10-21 RX ORDER — CALCIUM CARBONATE 200(500)MG
500 TABLET,CHEWABLE ORAL 3 TIMES DAILY PRN
Status: DISCONTINUED | OUTPATIENT
Start: 2022-10-21 | End: 2022-10-21 | Stop reason: HOSPADM

## 2022-10-21 RX ORDER — LIDOCAINE HYDROCHLORIDE 10 MG/ML
INJECTION, SOLUTION EPIDURAL; INFILTRATION; INTRACAUDAL; PERINEURAL
Status: DISCONTINUED | OUTPATIENT
Start: 2022-10-21 | End: 2022-10-21

## 2022-10-21 RX ORDER — GABAPENTIN 300 MG/1
300 CAPSULE ORAL EVERY 8 HOURS
Qty: 15 CAPSULE | Refills: 0 | Status: SHIPPED | OUTPATIENT
Start: 2022-10-21 | End: 2023-08-23

## 2022-10-21 RX ORDER — ROPIVACAINE HYDROCHLORIDE 5 MG/ML
INJECTION, SOLUTION EPIDURAL; INFILTRATION; PERINEURAL
Status: COMPLETED | OUTPATIENT
Start: 2022-10-21 | End: 2022-10-21

## 2022-10-21 RX ADMIN — IPRATROPIUM BROMIDE AND ALBUTEROL SULFATE 3 ML: 2.5; .5 SOLUTION RESPIRATORY (INHALATION) at 11:10

## 2022-10-21 RX ADMIN — ROCURONIUM BROMIDE 50 MG: 10 SOLUTION INTRAVENOUS at 07:10

## 2022-10-21 RX ADMIN — LIDOCAINE HYDROCHLORIDE 50 MG: 10 INJECTION, SOLUTION EPIDURAL; INFILTRATION; INTRACAUDAL; PERINEURAL at 07:10

## 2022-10-21 RX ADMIN — PHENYLEPHRINE HYDROCHLORIDE 100 MCG: 10 INJECTION INTRAVENOUS at 07:10

## 2022-10-21 RX ADMIN — GLYCOPYRROLATE 0.2 MG: 0.2 INJECTION INTRAMUSCULAR; INTRAVENOUS at 07:10

## 2022-10-21 RX ADMIN — PHENYLEPHRINE HYDROCHLORIDE 0.17 MCG/KG/MIN: 10 INJECTION INTRAVENOUS at 07:10

## 2022-10-21 RX ADMIN — ONDANSETRON HYDROCHLORIDE 4 MG: 2 SOLUTION INTRAMUSCULAR; INTRAVENOUS at 09:10

## 2022-10-21 RX ADMIN — CEFAZOLIN SODIUM 2 G: 2 INJECTION, SOLUTION INTRAVENOUS at 07:10

## 2022-10-21 RX ADMIN — ROCURONIUM BROMIDE 20 MG: 10 SOLUTION INTRAVENOUS at 09:10

## 2022-10-21 RX ADMIN — ROPIVACAINE HYDROCHLORIDE 25 ML: 5 INJECTION, SOLUTION EPIDURAL; INFILTRATION; PERINEURAL at 06:10

## 2022-10-21 RX ADMIN — SODIUM CHLORIDE, SODIUM GLUCONATE, SODIUM ACETATE, POTASSIUM CHLORIDE AND MAGNESIUM CHLORIDE: 526; 502; 368; 37; 30 INJECTION, SOLUTION INTRAVENOUS at 07:10

## 2022-10-21 RX ADMIN — SUGAMMADEX 200 MG: 100 INJECTION, SOLUTION INTRAVENOUS at 09:10

## 2022-10-21 RX ADMIN — DEXAMETHASONE SODIUM PHOSPHATE 6 MG: 4 INJECTION, SOLUTION INTRA-ARTICULAR; INTRALESIONAL; INTRAMUSCULAR; INTRAVENOUS; SOFT TISSUE at 07:10

## 2022-10-21 RX ADMIN — PROPOFOL 100 MG: 10 INJECTION, EMULSION INTRAVENOUS at 07:10

## 2022-10-21 RX ADMIN — FENTANYL CITRATE 100 MCG: 50 INJECTION, SOLUTION INTRAMUSCULAR; INTRAVENOUS at 07:10

## 2022-10-21 NOTE — ANESTHESIA PROCEDURE NOTES
Intubation    Date/Time: 10/21/2022 7:12 AM  Performed by: Katalina Barlow CRNA  Authorized by: Blayne Magallon MD     Intubation:     Induction:  Intravenous    Intubated:  Postinduction    Mask Ventilation:  Easy mask    Attempts:  1    Attempted By:  CRNA    Method of Intubation:  Direct    Blade:  Zuniga 2    Laryngeal View Grade: Grade I - full view of cords      Difficult Airway Encountered?: No      Complications:  None    Airway Device:  Oral endotracheal tube    Airway Device Size:  7.5    Style/Cuff Inflation:  Cuffed (inflated to minimal occlusive pressure)    Inflation Amount (mL):  8    Tube secured:  23    Secured at:  The lips    Placement Verified By:  Capnometry    Complicating Factors:  None    Findings Post-Intubation:  BS equal bilateral and atraumatic/condition of teeth unchanged

## 2022-10-21 NOTE — ANESTHESIA PREPROCEDURE EVALUATION
10/21/2022  Arsenio Kang is a 67 y.o., male.    Other Medical History   Hypertension Bursitis   Arthritis Heart murmur   Sleep apnea      13.9/42.8/281k    Pre-op Assessment    I have reviewed the Patient Summary Reports.     I have reviewed the Nursing Notes. I have reviewed the NPO Status.   I have reviewed the Medications.     Review of Systems  Anesthesia Hx:   Denies Personal Hx of Anesthesia complications.   Social:  Non-Smoker    Hematology/Oncology:  Hematology Normal        Cardiovascular:   Hypertension    Pulmonary:   Sleep Apnea, CPAP    Hepatic/GI:  Hepatic/GI Normal    Musculoskeletal:   Arthritis     Endocrine:  Obesity / BMI > 30      Physical Exam  General: Cooperative and Alert  Grimacing in pain with R shoulder pain  Airway:  Mallampati: III   Mouth Opening: Small, but > 3cm  TM Distance: 4 - 6 cm  Tongue: Normal    Dental:  Periodontal disease        Anesthesia Plan  Type of Anesthesia, risks & benefits discussed:    Anesthesia Type: Gen ETT  Intra-op Monitoring Plan: Standard ASA Monitors  Post Op Pain Control Plan: multimodal analgesia  Induction:  IV  Informed Consent: Informed consent signed with the Patient and all parties understand the risks and agree with anesthesia plan.  All questions answered.   ASA Score: 2  Day of Surgery Review of History & Physical: H&P Update referred to the surgeon/provider.    Ready For Surgery From Anesthesia Perspective.     .

## 2022-10-21 NOTE — DISCHARGE INSTRUCTIONS
[facility] DISCHARGE [place]. [address]  [phone]    DIET    YOUR FIRST MEAL SHOULD BE LIQUID: I.E. SOUPS, JELLO, JUICE. IF YOUR LIQUID MEAL IS TOLERATED WELL THEN YOU MAY PROGRESS TO A SMALL LIGHT MEAL.   IF NAUSEA AND VOMITING OCCUR RETURN TO THE LIQUID DIET AND PROGRESS TO A NORMAL SOLID DIET SLOWLY.  IF THE NAUSEA AND VOMITING DOES NOT STOP, NOTIFY YOUR HEALTH CARE PROVIDER.      GENERAL ANESTHESIA OR SEDATION    DO NOT DRIVE OR PARTICIPATE IN ANY ACTIVITIES THAT REQUIRE COORDINATION FOR THE NEXT 24 HOURS: I.E. SWIMMING, BIKING, OPERATING HEAVY MACHINERY, COOKING, USING POWER TOOLS, CLIMBING LADDERS.   FOR THE NEXT 24 HOURS DO NOT: DRIVE, DRINK ALCOHOL, MAKE ANY IMPORTANT DECISIONS OR SIGN ANY LEGAL DOCUMENTS.   STAY WITH AN ADULT DURING THE 24 HOURS AFTER YOUR SURGERY.   DRINK ENOUGH FLUIDS TO KEEP YOUR URINE CLEAR TO PALE YELLOW.      PREVENTING CONSTIPATION AFTER SURGERY    EAT FOOD HIGH IN FIBER AND DRINK PLENTY OF FLUIDS, ESPECIALLY WATER.   YOU MAY TAKE AN OVER THE COUNTER FIBER SUPPLEMENT OR STOOL SOFTENER AS DIRECTED ON THE PACKAGE.   STAYING MOBILE, IF POSSIBLE, HELPS TO PREVENT CONSTIPATION.  CONTACT YOUR DOCTOR IF YOU HAVE NOT HAD A BOWEL MOVEMENT IN 3 DAYS AFTER SURGERY.       INFECTION CONTROL    KEEP YOUR DRESSING CLEAN, DRY AND INTACT.   FOLLOW PHYSICIAN INSTRUCTIONS REGARDING REMOVAL OF DRESSING.  DO NOT TOUCH SURGICAL SITE OR APPLY LOTIONS, POWDERS, CREAMS OR OINTMENTS ON YOUR INCISION UNLESS INSTRUCTED TO DO SO BY YOUR HEALTH CARE PROVIDER.  ONCE THE DRESSING HAS BEEN REMOVED, CHECK THE INCISION SITE DAILY FOR: INCREASED REDNESS, SWELLING, FLUID OR BLOOD, WARMTH, PUS, FOUL SMELL OR INCREASED PAIN.      DEEP VEIN THROMBOSIS (DVT)    A DVT IS A BLOOD CLOT THAT CAN DEVELOP IN THE DEEP AND LARGER VEINS OF THE LEG, ARM OR PELVIS.   RISK FACTORS INCLUDE SITTING OR LYING FOR LONG PERIODS OF TIME. THIS INCLUDES RECOVERING FROM SURGERY.  PREVENTION INCLUDES:  AVOID SITTING STILL FOR LONG PERIODS WITHOUT MOVING YOUR LEGS, DO NOT SMOKE AND IF POSSIBLE AVOID MEDICATIONS THAT CONTAIN ESTROGEN.   SIGNS AND SYMPTOMS THAT SHOULD BE REPORTED IMMEDIATELY INCLUDE: SWELLING IN AN ARM OR LEG, WARMTH, REDNESS OR PAIN IN ONE AREA OF THE LEG OR ARM THAT DOES NOT COME FROM THE INCISION. IF THE CLOT IS IN YOUR LEG, THE SYMPTOMS WILL BE WORSE WHEN STANDING OR WALKING.   SIGNS OF A PULMONARY EMBOLISM OR PE (A CLOT THAT MOVED TO YOUR LUNG): SHORTNESS OF BREATH, COUGHING , ESPECIALLY IF ACCOMPANIED WITH BLOODY MUCUS, CHEST PAIN OR RAPID HEART RATE.  IF YOU EXPERIENCE THESE SYMPTOMS, YOU SHOULD GET EMERGENCY TREATMENT RIGHT AWAY. DO NOT WAIT TO SEE IF THESE SYMPTOMS WILL GO AWAY. DO NOT DRIVE YOURSELF TO THE HOSPITAL.       CONTACT YOUR HEALTH CARE PROVIDER IF:    YOU HAVE REDNESS, SWELLING OR PAIN AROUND YOUR INCISION.  YOUR INCISION FEELS WARM TO THE TOUCH OR IS LEAKING EXCESSIVE FLUID/ BLOOD.  YOUR SUTURES OR STAPLES HAVE COME UNDONE.  YOU HAVE A TEMPERATURE .5 OR GREATER.  YOU ARE NOT ABLE TO URINATE WITHIN 6 HOURS AFTER SURGERY.  YOU HAVE UNRESOLVED NAUSEA AND VOMITING.       SEEK IMMEDIATE MEDICAL CARE IF:    YOU HAVE PERSISTENT NAUSEA AND VOMITING.   YOU ARE UNABLE TO EAT OR DRINK.   YOU HAVE DIFFICULTY SPEAKING AND/ OR BREATHING.  YOUR SKIN COLOR APPEARS BLUE OR GRAY.  YOU HAVE A RED STREAK COMING FROM YOUR INCISION.  YOUR INCISION BLEEDS THROUGH THE DRESSING AND DOES NOT STOP WITH GENTLY PRESSURE.  YOU HAVE SEVERE PAIN THAT DOES NOT DECREASE WITH YOUR MEDICATIONS.

## 2022-10-21 NOTE — TRANSFER OF CARE
Anesthesia Transfer of Care Note    Patient: Arsenio Kang    Procedure(s) Performed: Procedure(s) (LRB):  REPAIR, ROTATOR CUFF, ARTHROSCOPIC (Right)    Patient location: PACU    Anesthesia Type: general    Transport from OR: Transported from OR on room air with adequate spontaneous ventilation    Post pain: adequate analgesia    Post assessment: no apparent anesthetic complications    Post vital signs: stable    Level of consciousness: sedated    Nausea/Vomiting: no nausea/vomiting    Complications: none    Transfer of care protocol was followed

## 2022-10-21 NOTE — ANESTHESIA PROCEDURE NOTES
Right single shot interscalene block    Patient location during procedure: pre-op   Block not for primary anesthetic.  Reason for block: at surgeon's request and post-op pain management   Post-op Pain Location: R shoulder pain   Start time: 10/21/2022 6:55 AM  Timeout: 10/21/2022 6:54 AM   End time: 10/21/2022 6:57 AM    Staffing  Authorizing Provider: Blayne Magallon MD  Performing Provider: Blayne Magallon MD    Preanesthetic Checklist  Completed: patient identified, IV checked, site marked, risks and benefits discussed, surgical consent, monitors and equipment checked, pre-op evaluation and timeout performed  Peripheral Block  Patient position: sitting  Prep: ChloraPrep  Patient monitoring: heart rate, cardiac monitor, continuous pulse ox, continuous capnometry and frequent blood pressure checks  Block type: interscalene  Laterality: right  Injection technique: single shot  Needle  Needle type: Stimuplex   Needle gauge: 22 G  Needle length: 4 in  Needle localization: anatomical landmarks, ultrasound guidance and nerve stimulator   -ultrasound image captured on disc.  Assessment  Injection assessment: negative aspiration, negative parasthesia and local visualized surrounding nerve  Paresthesia pain: none  Heart rate change: no  Slow fractionated injection: yes  Pain Tolerance: comfortable throughout block  Medications:    Medications: ropivacaine (NAROPIN) injection 0.5% - Perineural   25 mL - 10/21/2022 6:57:00 AM    Additional Notes  ISB, ok visualization of C5-C7 roots with needle guided adjacent to structures, local deposited without complication, no paresthesias, no complications noted.  Stim > 0.4 mA; no stim less than this threshold prior to injection.  VSS.  [facility] RN monitoring vitals throughout procedure.  Patient tolerated procedure well.

## 2022-10-21 NOTE — ANESTHESIA POSTPROCEDURE EVALUATION
Anesthesia Post Evaluation    Patient: Arsenio Kang    Procedure(s) Performed: Procedure(s) (LRB):  REPAIR, ROTATOR CUFF, ARTHROSCOPIC (Right)    Final Anesthesia Type: general      Patient location during evaluation: PACU  Patient participation: Yes- Able to Participate  Level of consciousness: awake and alert  Post-procedure vital signs: reviewed and stable  Pain management: adequate  Airway patency: patent    PONV status at discharge: No PONV  Anesthetic complications: no      Respiratory status: unassisted  Hydration status: euvolemic  Follow-up not needed.      ISB working well  Required duoneb treatment and IS in recovery  Likely secondary to phrenic n block    Vitals Value Taken Time   /88 10/21/22 1231   Temp nl 10/21/22 1304   Pulse 99 10/21/22 1231   Resp 19 10/21/22 1231   SpO2 95 % 10/21/22 1231         Event Time   Out of Recovery 10:43:00         Pain/Shanel Score: Shanel Score: 8 (10/21/2022 10:40 AM)

## 2022-10-21 NOTE — OP NOTE
Operative Report    Date of operative procedure: 10/21/22    Location: [place] [address]     Name: Arsenio Kang   : 55 MRN: [id number]    Preoperative diagnosis:   1. Right shoulder full-thickness rotator cuff tear  2.  Biceps tendinopathy    Postoperative diagnosis:  1.  Right shoulder full-thickness rotator cuff tear  2.  Biceps rupture  3.  Right shoulder synovitis and subacromial bursitis     Procedure performed:  1. Right shoulder diagnostic arthroscopy   2. Arthroscopic debridement of residual biceps tendon stump, synovium, subacromial bursa   3. Arthroscopic rotator cuff repair    Attending Surgeon: Prince Villatoro MD    Anesthesia General plus interscalene block  Estimated blood loss: Minimal  Complications: None apparent    Implants:  Rotator cuff repair:   Implant Name Type Inv. Item Serial No.  Lot No. LRB No. Used Action   ANCHOR BIOCOMP W/3 SUTURES - [id number]  ANCHOR BIOCOMP W/3 SUTURES  [employer] [id number] Right 1 Implanted     Indications for procedure: The patient is a 67-year-old male with a history of right shoulder pain.  He had an MRI completed following the injury showing full thickness rotator cuff tear. He had good passive range of motion, but was limited on his active range of motion and was painful.  He had persistent symptoms of pain, weakness, and loss of range of motion. The patient failed nonoperative management to include medications and altering his activities.  We discussed different treatment options including nonoperative and operative management.  The natural history of conservative treatment was discussed.  Decision was made to proceed with right shoulder arthroscopy and rotator cuff repair and other indicated procedures.  He understood the risks and benefits of surgery including the risk of continued pain, continued functional deficits, failure of healing, stiffness, neurovascular injury, infection, possible need for further surgery, anesthesia and medical  complications and wished to proceed.  Informed consent document was signed.    Procedure in detail: The patient was met in the preoperative holding area where verbal and written informed consent were reobtained and confirmed.  The operative extremity was marked with an indelible ink marker.  Regional nerve block was placed preoperatively by the anesthesia team.  The patient was taken to the operating room and placed in the supine position.  General anesthesia was induced.  Exam under anesthesia revealed full passive range of motion and a stable translational exam.  The patient was placed in the beachchair position and the right upper extremity and shoulder area were prepped and draped in the normal sterile fashion. Care was taken to ensure the cervical spine was well positioned and the face, eyes and all bony prominences well protected. Preoperative antibiotics were administered.  A preoperative timeout was performed confirming patient identification, operative extremity, and procedure to be performed.  All were in agreement and we proceeded with surgery.    A standard posterior portal was established with an 11 blade.  The arthroscope was inserted into the glenohumeral joint atraumatically.  The joint was insufflated with arthroscopic fluid.  I then made a standard anterior portal in the rotator interval placed under spinal needle localization.  An arthroscopic probe was inserted through the anterior portal and diagnostic arthroscopy commenced. The joint was examined systematically.  The labrum was intact. Articular cartilage of the humeral head was intact.  Articular cartilage of the glenoid was intact.  The subscapularis tendon was intact.  There were no loose bodies in the recess.  The biceps tendon was ruptured. Residual stump was debrided back to its origin at the superior labrum.  A full-thickness rotator cuff tear involving the supraspinatus tendon was observed.  There was extensive synovitis as well which  was debrided.  The subacromial space was entered and a shaver was used to complete a bursectomy.  Accessory lateral portals were achieved.  The rotator cuff was mobilized and the greater tuberosity was freed of soft tissue.  After mobilization, I began my rotator cuff repair.    I placed a triple loaded corkscrew suture anchor within the footprint with excellent purchase. Sutures were shuttled and passed with the scorpion device.  Good spacing and coverage was noted. This resulted in excellent repair of the rotator cuff with no excessive tension on the repair.  The tissue quality was good.  Final arthroscopic pictures were obtained.  The shoulder was extravasated of fluid. Portals were closed with subcuticular stitches and Steri-Strips and sterile dressings were applied.  The patient was placed in a sling and ice device was applied.  He was extubated and taken to the recovery room in good condition having tolerated the procedure well.  Counts were correct x2.    Postoperative plan: The patient will be nonweightbearing to the right upper extremity.  Patient will maintain a sling at all times other than hygiene and gentle range of motion exercises of the elbow and distally. Multimodal pain control. Fllow up in 1 week for a wound check.

## 2022-10-21 NOTE — OR NURSING
06:51  TIMEOUT DONE    06:55  DR. AKERS WILL ATTEMPT TO DO A RIGHT SUPRACLAVICULAR NERVE BLOCK.    06:57  BLOCK COMPLETED AND TOLERATED WELL.

## 2022-10-21 NOTE — PLAN OF CARE
Preparing for discharge. Vss. Inst reviewed. Home with [facility] unit. Abduction sling RUE. Instructed on IS use

## 2022-10-26 ENCOUNTER — OFFICE VISIT (OUTPATIENT)
Dept: ORTHOPEDICS | Facility: CLINIC | Age: 67
End: 2022-10-26
Payer: COMMERCIAL

## 2022-10-26 VITALS
DIASTOLIC BLOOD PRESSURE: 84 MMHG | HEIGHT: 61 IN | SYSTOLIC BLOOD PRESSURE: 154 MMHG | HEART RATE: 64 BPM | BODY MASS INDEX: 38.89 KG/M2 | TEMPERATURE: 97 F | WEIGHT: 206 LBS

## 2022-10-26 DIAGNOSIS — Z98.890 S/P ROTATOR CUFF REPAIR: Primary | ICD-10-CM

## 2022-10-26 PROCEDURE — 1159F MED LIST DOCD IN RCRD: CPT | Mod: CPTII,,, | Performed by: REHABILITATION UNIT

## 2022-10-26 PROCEDURE — 3077F PR MOST RECENT SYSTOLIC BLOOD PRESSURE >= 140 MM HG: ICD-10-PCS | Mod: CPTII,,, | Performed by: REHABILITATION UNIT

## 2022-10-26 PROCEDURE — 1101F PT FALLS ASSESS-DOCD LE1/YR: CPT | Mod: CPTII,,, | Performed by: REHABILITATION UNIT

## 2022-10-26 PROCEDURE — 99024 PR POST-OP FOLLOW-UP VISIT: ICD-10-PCS | Mod: ,,, | Performed by: REHABILITATION UNIT

## 2022-10-26 PROCEDURE — 3079F PR MOST RECENT DIASTOLIC BLOOD PRESSURE 80-89 MM HG: ICD-10-PCS | Mod: CPTII,,, | Performed by: REHABILITATION UNIT

## 2022-10-26 PROCEDURE — 3079F DIAST BP 80-89 MM HG: CPT | Mod: CPTII,,, | Performed by: REHABILITATION UNIT

## 2022-10-26 PROCEDURE — 3288F PR FALLS RISK ASSESSMENT DOCUMENTED: ICD-10-PCS | Mod: CPTII,,, | Performed by: REHABILITATION UNIT

## 2022-10-26 PROCEDURE — 99024 POSTOP FOLLOW-UP VISIT: CPT | Mod: ,,, | Performed by: REHABILITATION UNIT

## 2022-10-26 PROCEDURE — 3077F SYST BP >= 140 MM HG: CPT | Mod: CPTII,,, | Performed by: REHABILITATION UNIT

## 2022-10-26 PROCEDURE — 3288F FALL RISK ASSESSMENT DOCD: CPT | Mod: CPTII,,, | Performed by: REHABILITATION UNIT

## 2022-10-26 PROCEDURE — 1101F PR PT FALLS ASSESS DOC 0-1 FALLS W/OUT INJ PAST YR: ICD-10-PCS | Mod: CPTII,,, | Performed by: REHABILITATION UNIT

## 2022-10-26 PROCEDURE — 1159F PR MEDICATION LIST DOCUMENTED IN MEDICAL RECORD: ICD-10-PCS | Mod: CPTII,,, | Performed by: REHABILITATION UNIT

## 2022-10-26 NOTE — PROGRESS NOTES
Subjective:      Patient ID: Arsenio Kang is a 67 y.o. male.    Chief Complaint: Post-op Evaluation of the Right Shoulder (Post op ATS right shoulder w/ rc repair, gl 10/21/22, pt says pain comes and goes, using ice pack says its been a big help  )    Date of procedure: 10/21/22    Procedure:  1. Right shoulder diagnostic arthroscopy   2. Arthroscopic debridement of residual biceps tendon stump, synovium, subacromial bursa   3. Arthroscopic rotator cuff repair    Aresnio Kang returns to the clinic today for post-operative examination. Patient is status post the above-stated procedure.  He is accompanied by his daughter today.  Overall he is doing well. He has been compliant with his sling. Denies any sensorimotor changes or wound issues. Pain controlled. He has not started PT per my request. No complaints at this time. No f/c/ns.       Review of Systems  Comprehensive review of systems completed and negative except as per HPI.        Objective:     Vitals:    10/26/22 0944   BP: (!) 154/84   Pulse: 64   Temp: 97.2 °F (36.2 °C)       General: well-developed well-nourished in no acute distress    Physical Exam:  Right shoulder  Incisions are healing appropriately with no erythema, fluctuance, induration, drainage or external signs of infection. Steri strips in place  No gross swelling or epitrochlear lymphadenopathy appreciated  ROM of the shoulder deferred at this time. Firing deltoid. Full range of motion of the elbow and distally.  Sensation grossly intact to all dermatomal distributions  No neurological deficits appreciated   Palpable radial pulse        Assessment:       Encounter Diagnosis   Name Primary?    S/P rotator cuff repair Yes         Plan:       Arsenio was seen today for post-op evaluation.    Diagnoses and all orders for this visit:    S/P rotator cuff repair    s/p above stated procedure; doing well     Surgery and arthroscopic images discussed.   PT - prescription provided   Pain control -  continue ice and meds. Risks of medications discussed  Immobilization - Continue sling at all times. May remove for hygiene and ROM of the elbow and distally.  WB- NWB RUE  F/u in 3 weeks    Follow-up: Arsenio Kang to follow up in 3 weeks. If there are any questions prior to this, the patient was instructed to contact the office.

## 2022-11-07 NOTE — DISCHARGE SUMMARY
[facility] - Periop Services  Discharge Note  Short Stay    Procedure(s) (LRB):  1. Right shoulder diagnostic arthroscopy   2. Arthroscopic debridement of residual biceps tendon stump, synovium, subacromial bursa   3. Arthroscopic rotator cuff repair    OUTCOME: Patient tolerated treatment/procedure well without complication and is now ready for discharge.    DISPOSITION: Home or Self Care    FINAL DIAGNOSIS:  1.  Right shoulder full-thickness rotator cuff tear  2.  Biceps rupture  3.  Right shoulder synovitis and subacromial bursitis     FOLLOWUP: In clinic    DISCHARGE INSTRUCTIONS:    Discharge Procedure Orders   Diet general   Order Comments: As prior to surgery     Keep surgical extremity elevated     Ice to affected area     Lifting restrictions     No driving, operating heavy equipment or signing legal documents while taking pain medication     Other restrictions (specify):   Order Comments: Weight Bearing:   ROM:     Call MD for:  temperature >100.4     Call MD for:  persistent nausea and vomiting     Call MD for:  severe uncontrolled pain     Call MD for:  difficulty breathing, headache or visual disturbances     Call MD for:  redness, tenderness, or signs of infection (pain, swelling, redness, odor or green/yellow discharge around incision site)     Call MD for:  hives     Call MD for:  persistent dizziness or light-headedness     Call MD for:  extreme fatigue     Remove dressing in 72 hours     Activity as tolerated     Shower on day dressing removed (No bath)     Non weight bearing         Clinical Reference Documents Added to Patient Instructions         Document    HOW TO USE AN INCENTIVE SPIROMETER (ENGLISH)            TIME SPENT ON DISCHARGE: 10 minutes

## 2022-11-16 ENCOUNTER — HOSPITAL ENCOUNTER (OUTPATIENT)
Dept: RADIOLOGY | Facility: CLINIC | Age: 67
Discharge: HOME | End: 2022-11-16
Attending: REHABILITATION UNIT
Payer: COMMERCIAL

## 2022-11-16 ENCOUNTER — OFFICE VISIT (OUTPATIENT)
Dept: ORTHOPEDICS | Facility: CLINIC | Age: 67
End: 2022-11-16
Payer: COMMERCIAL

## 2022-11-16 VITALS
BODY MASS INDEX: 38.89 KG/M2 | WEIGHT: 206 LBS | HEART RATE: 86 BPM | DIASTOLIC BLOOD PRESSURE: 78 MMHG | HEIGHT: 61 IN | SYSTOLIC BLOOD PRESSURE: 181 MMHG

## 2022-11-16 DIAGNOSIS — M17.11 PRIMARY OSTEOARTHRITIS OF RIGHT KNEE: ICD-10-CM

## 2022-11-16 DIAGNOSIS — G89.29 CHRONIC PAIN OF BOTH KNEES: ICD-10-CM

## 2022-11-16 DIAGNOSIS — G89.29 CHRONIC PAIN OF BOTH KNEES: Primary | ICD-10-CM

## 2022-11-16 DIAGNOSIS — M25.561 CHRONIC PAIN OF BOTH KNEES: Primary | ICD-10-CM

## 2022-11-16 DIAGNOSIS — M25.562 CHRONIC PAIN OF BOTH KNEES: ICD-10-CM

## 2022-11-16 DIAGNOSIS — M25.561 CHRONIC PAIN OF BOTH KNEES: ICD-10-CM

## 2022-11-16 DIAGNOSIS — M17.12 PRIMARY OSTEOARTHRITIS OF LEFT KNEE: ICD-10-CM

## 2022-11-16 DIAGNOSIS — M25.562 CHRONIC PAIN OF BOTH KNEES: Primary | ICD-10-CM

## 2022-11-16 PROCEDURE — 99024 PR POST-OP FOLLOW-UP VISIT: ICD-10-PCS | Mod: ,,, | Performed by: REHABILITATION UNIT

## 2022-11-16 PROCEDURE — 3078F PR MOST RECENT DIASTOLIC BLOOD PRESSURE < 80 MM HG: ICD-10-PCS | Mod: CPTII,,, | Performed by: REHABILITATION UNIT

## 2022-11-16 PROCEDURE — 20610 LARGE JOINT ASPIRATION/INJECTION: BILATERAL KNEE: ICD-10-PCS | Mod: 79,50,, | Performed by: REHABILITATION UNIT

## 2022-11-16 PROCEDURE — 1101F PR PT FALLS ASSESS DOC 0-1 FALLS W/OUT INJ PAST YR: ICD-10-PCS | Mod: CPTII,,, | Performed by: REHABILITATION UNIT

## 2022-11-16 PROCEDURE — 3077F PR MOST RECENT SYSTOLIC BLOOD PRESSURE >= 140 MM HG: ICD-10-PCS | Mod: CPTII,,, | Performed by: REHABILITATION UNIT

## 2022-11-16 PROCEDURE — 73564 XR KNEE COMP 4 OR MORE VIEWS BILAT: ICD-10-PCS | Mod: 50,,, | Performed by: REHABILITATION UNIT

## 2022-11-16 PROCEDURE — 3008F BODY MASS INDEX DOCD: CPT | Mod: CPTII,,, | Performed by: REHABILITATION UNIT

## 2022-11-16 PROCEDURE — 3288F PR FALLS RISK ASSESSMENT DOCUMENTED: ICD-10-PCS | Mod: CPTII,,, | Performed by: REHABILITATION UNIT

## 2022-11-16 PROCEDURE — 3078F DIAST BP <80 MM HG: CPT | Mod: CPTII,,, | Performed by: REHABILITATION UNIT

## 2022-11-16 PROCEDURE — 20610 DRAIN/INJ JOINT/BURSA W/O US: CPT | Mod: 79,50,, | Performed by: REHABILITATION UNIT

## 2022-11-16 PROCEDURE — 73564 X-RAY EXAM KNEE 4 OR MORE: CPT | Mod: 50,,, | Performed by: REHABILITATION UNIT

## 2022-11-16 PROCEDURE — 3077F SYST BP >= 140 MM HG: CPT | Mod: CPTII,,, | Performed by: REHABILITATION UNIT

## 2022-11-16 PROCEDURE — 1101F PT FALLS ASSESS-DOCD LE1/YR: CPT | Mod: CPTII,,, | Performed by: REHABILITATION UNIT

## 2022-11-16 PROCEDURE — 3288F FALL RISK ASSESSMENT DOCD: CPT | Mod: CPTII,,, | Performed by: REHABILITATION UNIT

## 2022-11-16 PROCEDURE — 99024 POSTOP FOLLOW-UP VISIT: CPT | Mod: ,,, | Performed by: REHABILITATION UNIT

## 2022-11-16 PROCEDURE — 3008F PR BODY MASS INDEX (BMI) DOCUMENTED: ICD-10-PCS | Mod: CPTII,,, | Performed by: REHABILITATION UNIT

## 2022-11-16 RX ORDER — LIDOCAINE HYDROCHLORIDE 20 MG/ML
2 INJECTION, SOLUTION INFILTRATION; PERINEURAL
Status: DISCONTINUED | OUTPATIENT
Start: 2022-11-16 | End: 2022-11-16 | Stop reason: HOSPADM

## 2022-11-16 RX ORDER — OXYCODONE HYDROCHLORIDE 15 MG/1
10 TABLET ORAL EVERY 4 HOURS PRN
COMMUNITY
End: 2022-11-28 | Stop reason: ALTCHOICE

## 2022-11-16 RX ORDER — BETAMETHASONE SODIUM PHOSPHATE AND BETAMETHASONE ACETATE 3; 3 MG/ML; MG/ML
12 INJECTION, SUSPENSION INTRA-ARTICULAR; INTRALESIONAL; INTRAMUSCULAR; SOFT TISSUE
Status: DISCONTINUED | OUTPATIENT
Start: 2022-11-16 | End: 2022-11-16 | Stop reason: HOSPADM

## 2022-11-16 RX ADMIN — BETAMETHASONE SODIUM PHOSPHATE AND BETAMETHASONE ACETATE 12 MG: 3; 3 INJECTION, SUSPENSION INTRA-ARTICULAR; INTRALESIONAL; INTRAMUSCULAR; SOFT TISSUE at 09:11

## 2022-11-16 RX ADMIN — LIDOCAINE HYDROCHLORIDE 2 MG: 20 INJECTION, SOLUTION INFILTRATION; PERINEURAL at 09:11

## 2022-11-16 NOTE — PROGRESS NOTES
Subjective:      Patient ID: Arsenio Kang is a 67 y.o. male.    Chief Complaint: Follow-up of the Right Shoulder and Follow-up (ATS Vrchiquis shoulder w/ rc repair, gl 10/21/22, pt has shocking sensation in shoulder every now and jamal, says PT has been a big help and doing good so far, has questions about bilateral knee pain, says pain in knees is worse than pain in shoulder)    Date of procedure: 10/21/22    Procedure:  1. Right shoulder diagnostic arthroscopy   2. Arthroscopic debridement of residual biceps tendon stump, synovium, subacromial bursa   3. Arthroscopic rotator cuff repair    Arsenio Kang returns to the clinic today for post-operative examination. Patient is status post the above-stated procedure.  He is accompanied by his daughter today.  Overall he is doing well. He has been compliant with his sling. Denies any sensorimotor changes or wound issues. Pain controlled. He has started PT and progressing passive motion. No f/c/ns.  Main complaint today is bilateral knee pain.  He states his knees are bothering him more than his shoulder.  Pain is localized diffusely about the knees.  He has had radiographs in the past and told he had advanced osteoarthritis.  He is not had any treatment or additional workup.     Review of Systems  Comprehensive review of systems completed and negative except as per HPI.        Objective:     Vitals:    11/16/22 0936   BP: (!) 181/78   Pulse: 86       General: well-developed well-nourished in no acute distress    Physical Exam:  Right shoulder  Incisions are well healed with no erythema, fluctuance, induration, drainage or external signs of infection.    No gross swelling or epitrochlear lymphadenopathy appreciated  PROM FF 90, ER 10  Firing deltoid. Full range of motion of the elbow and distally.  Sensation grossly intact to all dermatomal distributions  No neurological deficits appreciated   Palpable radial pulse    bilateral KNEE:     Alignment: varus  Appearance:  skin in intact without lesion  Effusion: none  Gait: antalgic  Straight Leg Raise: negative  Log Roll: negative  Hip ROM: full and painless  Ankle ROM: full and painless   Knee ROM:  2 - 115  Tenderness: diffuse TTP    Patellar Mobility: decreased  Patellar grind: +  PF Crepitus:+       Valgus Stress @ 0 deg: stable  Valgus Stress @ 30 deg: stable  Varus Stress @ 0 deg: stable  Varus Stress @ 30 deg: stable  Lachman: stable  Ant Drawer: negative   Post Drawer: negative  Posterior Sag Sign: negative  Neurological deficits: SILT dp/sp/t distributions  Motor: 5/5 EHL/FHL/TA/GS    Warm well perfused lower extremity with capillary refill less than 2 seconds and sensation intact to light touch in the terminal nerve distributions. Calf soft and easily compressible without clinical sign of DVT. No palpable popliteal lymphadenopathy    Imaging:  Four view radiographs of the bilateral knees obtained today personally reviewed showing no acute fractures or dislocations.  Advanced tricompartmental osteoarthritic changes with loss of joint space and bone-on-bone arthritis most severe in the medial compartment.  There are surrounding osteophytes with subchondral sclerosis.  Varus alignment.    Large Joint Aspiration/Injection: bilateral knee    Date/Time: 11/16/2022 9:45 AM  Performed by: Prince Villatoro MD  Authorized by: Prince Villatoro MD     Consent Done?:  Yes (Verbal)  Indications:  Pain and arthritis  Site marked: the procedure site was marked    Timeout: prior to procedure the correct patient, procedure, and site was verified    Prep: patient was prepped and draped in usual sterile fashion    Local anesthesia used?: No      Details:  Needle Size:  21 G  Ultrasonic Guidance for needle placement?: No    Approach:  Anterolateral  Location:  Knee  Laterality:  Bilateral  Site:  Bilateral knee  Medications (Right):  2 mg LIDOcaine HCL 20 mg/ml (2%) 20 mg/mL (2 %); 12 mg betamethasone acetate-betamethasone sodium phosphate 6  mg/mL  Medications (Left):  2 mg LIDOcaine HCL 20 mg/ml (2%) 20 mg/mL (2 %); 12 mg betamethasone acetate-betamethasone sodium phosphate 6 mg/mL  Patient tolerance:  Patient tolerated the procedure well with no immediate complications          Assessment:       Encounter Diagnoses   Name Primary?    Chronic pain of both knees Yes    Primary osteoarthritis of left knee     Primary osteoarthritis of right knee            Plan:       Arsenio was seen today for follow-up and follow-up.    Diagnoses and all orders for this visit:    Chronic pain of both knees  -     X-Ray Knee Complete 4 Or More Views Bilat; Future  -     Large Joint Aspiration/Injection: bilateral knee    Primary osteoarthritis of left knee  -     Large Joint Aspiration/Injection: bilateral knee    Primary osteoarthritis of right knee  -     Large Joint Aspiration/Injection: bilateral knee      s/p above stated procedure; doing well from that with b/l knee OA     PT - continue per protocol  Pain control - continue ice and meds. Risks of medications discussed  Immobilization - Continue sling at all times for two more weeks. May remove for hygiene and ROM of the elbow and distally.  - NWB RUE  Provided with bilateral knee CSI for his OA as above. He tolerated this well. Home exercises. Weight loss.   F/u in 4 weeks    Follow-up: Arsenio Kang to follow up in 4 weeks. If there are any questions prior to this, the patient was instructed to contact the office.

## 2022-11-28 ENCOUNTER — TELEPHONE (OUTPATIENT)
Dept: ORTHOPEDICS | Facility: CLINIC | Age: 67
End: 2022-11-28
Payer: COMMERCIAL

## 2022-11-28 DIAGNOSIS — Z98.890 S/P ROTATOR CUFF REPAIR: Primary | ICD-10-CM

## 2022-11-28 RX ORDER — TRAMADOL HYDROCHLORIDE 50 MG/1
50 TABLET ORAL EVERY 6 HOURS PRN
Qty: 20 TABLET | Refills: 0 | Status: SHIPPED | OUTPATIENT
Start: 2022-11-28 | End: 2023-08-23

## 2022-11-28 NOTE — TELEPHONE ENCOUNTER
Pt's daughter called asking if pt could be prescribed something stronger than tylenol or advil to help with pain relief for physical therapy. Pt would also like to know when is he able to come out of his sling.

## 2022-12-14 ENCOUNTER — OFFICE VISIT (OUTPATIENT)
Dept: ORTHOPEDICS | Facility: CLINIC | Age: 67
End: 2022-12-14
Payer: COMMERCIAL

## 2022-12-14 VITALS
HEIGHT: 61 IN | BODY MASS INDEX: 38.89 KG/M2 | WEIGHT: 206 LBS | DIASTOLIC BLOOD PRESSURE: 74 MMHG | SYSTOLIC BLOOD PRESSURE: 160 MMHG | HEART RATE: 91 BPM

## 2022-12-14 DIAGNOSIS — Z98.890 S/P ROTATOR CUFF REPAIR: Primary | ICD-10-CM

## 2022-12-14 PROCEDURE — 3078F DIAST BP <80 MM HG: CPT | Mod: CPTII,,, | Performed by: REHABILITATION UNIT

## 2022-12-14 PROCEDURE — 1125F AMNT PAIN NOTED PAIN PRSNT: CPT | Mod: CPTII,,, | Performed by: REHABILITATION UNIT

## 2022-12-14 PROCEDURE — 3078F PR MOST RECENT DIASTOLIC BLOOD PRESSURE < 80 MM HG: ICD-10-PCS | Mod: CPTII,,, | Performed by: REHABILITATION UNIT

## 2022-12-14 PROCEDURE — 3077F SYST BP >= 140 MM HG: CPT | Mod: CPTII,,, | Performed by: REHABILITATION UNIT

## 2022-12-14 PROCEDURE — 99024 PR POST-OP FOLLOW-UP VISIT: ICD-10-PCS | Mod: ,,, | Performed by: REHABILITATION UNIT

## 2022-12-14 PROCEDURE — 3008F BODY MASS INDEX DOCD: CPT | Mod: CPTII,,, | Performed by: REHABILITATION UNIT

## 2022-12-14 PROCEDURE — 1159F MED LIST DOCD IN RCRD: CPT | Mod: CPTII,,, | Performed by: REHABILITATION UNIT

## 2022-12-14 PROCEDURE — 99024 POSTOP FOLLOW-UP VISIT: CPT | Mod: ,,, | Performed by: REHABILITATION UNIT

## 2022-12-14 PROCEDURE — 1159F PR MEDICATION LIST DOCUMENTED IN MEDICAL RECORD: ICD-10-PCS | Mod: CPTII,,, | Performed by: REHABILITATION UNIT

## 2022-12-14 PROCEDURE — 3077F PR MOST RECENT SYSTOLIC BLOOD PRESSURE >= 140 MM HG: ICD-10-PCS | Mod: CPTII,,, | Performed by: REHABILITATION UNIT

## 2022-12-14 PROCEDURE — 1125F PR PAIN SEVERITY QUANTIFIED, PAIN PRESENT: ICD-10-PCS | Mod: CPTII,,, | Performed by: REHABILITATION UNIT

## 2022-12-14 PROCEDURE — 3008F PR BODY MASS INDEX (BMI) DOCUMENTED: ICD-10-PCS | Mod: CPTII,,, | Performed by: REHABILITATION UNIT

## 2022-12-14 RX ORDER — HYDROCODONE BITARTRATE AND ACETAMINOPHEN 5; 325 MG/1; MG/1
1 TABLET ORAL EVERY 8 HOURS PRN
Qty: 15 TABLET | Refills: 0 | Status: SHIPPED | OUTPATIENT
Start: 2022-12-14 | End: 2023-08-23

## 2022-12-14 NOTE — PROGRESS NOTES
Subjective:      Patient ID: Arsenio Kang is a 67 y.o. male.    Chief Complaint: Follow-up of the Right Shoulder (Follow-up for right shoulder. Shoulder is doing good. Had a tightness in the middle of the night over the weekend but overall, is doing good. Still painful at times. Can only move it up about [facility].)      Date of procedure: 10/21/22    Procedure:  1. Right shoulder diagnostic arthroscopy   2. Arthroscopic debridement of residual biceps tendon stump, synovium, subacromial bursa   3. Arthroscopic rotator cuff repair    Arsenio Kang returns to the clinic today for post-operative examination. Patient is status post the above-stated procedure.  He is accompanied by his daughter today.  Overall he is doing well. He has been compliant with his sling.  He is wearing it in public and out of it at home.Denies any sensorimotor changes or wound issues. Pain controlled at rest but he has some discomfort with therapy which limits his participation.. He has been in PT and working on motion. No f/c/ns.  He reports excellent pain relief from the bilateral knee injections.     Review of Systems  Comprehensive review of systems completed and negative except as per HPI.        Objective:     Vitals:    12/14/22 0947   BP: (!) 160/74   Pulse: 91       General: well-developed well-nourished in no acute distress    Physical Exam:  Right shoulder  Incisions are well healed with no erythema, fluctuance, induration, drainage or external signs of infection.    No gross swelling or epitrochlear lymphadenopathy appreciated  PROM , Abd 90ER 30  Firing deltoid. Full range of motion of the elbow and distally.  Sensation grossly intact to all dermatomal distributions  No neurological deficits appreciated   Palpable radial pulse    bilateral KNEE:     Alignment: varus  Appearance: skin in intact without lesion  Effusion: none  Gait: antalgic  Straight Leg Raise: negative  Log Roll: negative  Hip ROM: full and  painless  Ankle ROM: full and painless   Knee ROM:  2 - 115  Tenderness: diffuse TTP    Patellar Mobility: decreased  Patellar grind: +  PF Crepitus:+       Valgus Stress @ 0 deg: stable  Valgus Stress @ 30 deg: stable  Varus Stress @ 0 deg: stable  Varus Stress @ 30 deg: stable  Lachman: stable  Ant Drawer: negative   Post Drawer: negative  Posterior Sag Sign: negative  Neurological deficits: SILT dp/sp/t distributions  Motor: 5/5 EHL/FHL/TA/GS    Warm well perfused lower extremity with capillary refill less than 2 seconds and sensation intact to light touch in the terminal nerve distributions. Calf soft and easily compressible without clinical sign of DVT. No palpable popliteal lymphadenopathy    Imaging:  Four view radiographs of the bilateral knees previously obtained show no acute fractures or dislocations.  Advanced tricompartmental osteoarthritic changes with loss of joint space and bone-on-bone arthritis most severe in the medial compartment.  There are surrounding osteophytes with subchondral sclerosis.  Varus alignment.        Assessment:       Encounter Diagnosis   Name Primary?    S/P rotator cuff repair Yes             Plan:       Arsenio was seen today for follow-up.    Diagnoses and all orders for this visit:    S/P rotator cuff repair      s/p above stated procedure; doing well with regard to b/l knee OA     PT - continue per protocol; progress motion   Pain control - continue ice and meds. Risks of medications discussed  Immobilization - Out of sling.    WB- NWB RUE  OTC meds. Home exercises. Weight loss. For his knees  Norco provided for pain control with PT  F/u in 4 weeks    Follow-up: Reidalison CHISHOLM Jorge Luis to follow up in 4 weeks. If there are any questions prior to this, the patient was instructed to contact the office.

## 2023-01-05 RX ORDER — GABAPENTIN 300 MG/1
300 CAPSULE ORAL EVERY 8 HOURS
Qty: 15 CAPSULE | Refills: 0 | Status: CANCELLED | OUTPATIENT
Start: 2023-01-05 | End: 2023-01-10

## 2023-01-05 NOTE — TELEPHONE ENCOUNTER
Pt's daughter called asking for a refill on Muscle relaxer's and also asking for a letter for his job regarding his recovery and how long it will take.

## 2023-01-09 RX ORDER — CYCLOBENZAPRINE HCL 10 MG
10 TABLET ORAL 3 TIMES DAILY PRN
Qty: 30 TABLET | Refills: 0 | Status: SHIPPED | OUTPATIENT
Start: 2023-01-09 | End: 2023-01-19

## 2023-01-18 ENCOUNTER — OFFICE VISIT (OUTPATIENT)
Dept: ORTHOPEDICS | Facility: CLINIC | Age: 68
End: 2023-01-18
Payer: COMMERCIAL

## 2023-01-18 VITALS — HEIGHT: 61 IN | BODY MASS INDEX: 38.89 KG/M2 | WEIGHT: 206 LBS

## 2023-01-18 DIAGNOSIS — Z98.890 S/P ROTATOR CUFF REPAIR: Primary | ICD-10-CM

## 2023-01-18 PROCEDURE — 99024 PR POST-OP FOLLOW-UP VISIT: ICD-10-PCS | Mod: ,,, | Performed by: REHABILITATION UNIT

## 2023-01-18 PROCEDURE — 3008F BODY MASS INDEX DOCD: CPT | Mod: CPTII,,, | Performed by: REHABILITATION UNIT

## 2023-01-18 PROCEDURE — 3008F PR BODY MASS INDEX (BMI) DOCUMENTED: ICD-10-PCS | Mod: CPTII,,, | Performed by: REHABILITATION UNIT

## 2023-01-18 PROCEDURE — 99024 POSTOP FOLLOW-UP VISIT: CPT | Mod: ,,, | Performed by: REHABILITATION UNIT

## 2023-01-18 NOTE — PROGRESS NOTES
Subjective:      Patient ID: Arsenio Kang is a 67 y.o. male.    Chief Complaint: Follow-up (ATS R shoulder w/ rc repair 10/21/22-1/19/23, ANITRA knee inj given 11/16/22, pt states everything has been going good, states not able to go all the way up with arm, pt states knees has been hurting for the pas four days, states inj helped with some relief, would like another inj )    Date of procedure: 10/21/22    Procedure:  1. Right shoulder diagnostic arthroscopy   2. Arthroscopic debridement of residual biceps tendon stump, synovium, subacromial bursa   3. Arthroscopic rotator cuff repair    Arsenio Kang returns to the clinic today for post-operative examination. Patient is status post the above-stated procedure.  He is accompanied by his daughter today.  Overall he is doing well.  He continues to work with physical therapy and see improvement in his motion and strength. Denies any sensorimotor changes or wound issues. No f/c/ns.  He reports excellent pain relief from the bilateral knee injections.  He is interested in additional injections.     Review of Systems  Comprehensive review of systems completed and negative except as per HPI.        Objective:     There were no vitals filed for this visit.      General: well-developed well-nourished in no acute distress    Physical Exam:  Right shoulder  Incisions are well healed with no erythema, fluctuance, induration, drainage or external signs of infection.    No gross swelling or epitrochlear lymphadenopathy appreciated  AROM , Abd 90, ER 30  Firing deltoid. Full range of motion of the elbow and distally.  Sensation grossly intact to all dermatomal distributions  No neurological deficits appreciated   Palpable radial pulse    bilateral KNEE:     Alignment: varus  Appearance: skin in intact without lesion  Effusion: none  Gait: antalgic  Straight Leg Raise: negative  Log Roll: negative  Hip ROM: full and painless  Ankle ROM: full and painless   Knee ROM:  2 -  115  Tenderness: diffuse TTP    Patellar Mobility: decreased  Patellar grind: +  PF Crepitus:+       Valgus Stress @ 0 deg: stable  Valgus Stress @ 30 deg: stable  Varus Stress @ 0 deg: stable  Varus Stress @ 30 deg: stable  Lachman: stable  Ant Drawer: negative   Post Drawer: negative  Posterior Sag Sign: negative  Neurological deficits: SILT dp/sp/t distributions  Motor: 5/5 EHL/FHL/TA/GS    Warm well perfused lower extremity with capillary refill less than 2 seconds and sensation intact to light touch in the terminal nerve distributions. Calf soft and easily compressible without clinical sign of DVT. No palpable popliteal lymphadenopathy    Imaging:  Four view radiographs of the bilateral knees previously obtained show no acute fractures or dislocations.  Advanced tricompartmental osteoarthritic changes with loss of joint space and bone-on-bone arthritis most severe in the medial compartment.  There are surrounding osteophytes with subchondral sclerosis.  Varus alignment.        Assessment:       Encounter Diagnosis   Name Primary?    S/P rotator cuff repair Yes           Plan:       Arsenio was seen today for follow-up.    Diagnoses and all orders for this visit:    S/P rotator cuff repair    s/p above stated procedure; doing well with regard to b/l knee OA     PT - continue per protocol; progress motion and strength   Pain control - continue ice and meds. Risks of medications discussed  Progress activity   OTC meds. Home exercises. Weight loss for his knees  F/u in 4 weeks  Continue off work  Bilateral knee CSI on return as it is too early now    Follow-up: Arsenio PHAN Jorge Luis to follow up in 4 weeks. If there are any questions prior to this, the patient was instructed to contact the office.

## 2023-03-02 ENCOUNTER — OFFICE VISIT (OUTPATIENT)
Dept: ORTHOPEDICS | Facility: CLINIC | Age: 68
End: 2023-03-02
Payer: COMMERCIAL

## 2023-03-02 VITALS
SYSTOLIC BLOOD PRESSURE: 129 MMHG | HEIGHT: 61 IN | BODY MASS INDEX: 41.31 KG/M2 | WEIGHT: 218.81 LBS | DIASTOLIC BLOOD PRESSURE: 68 MMHG | HEART RATE: 62 BPM

## 2023-03-02 DIAGNOSIS — M75.121 COMPLETE TEAR OF RIGHT ROTATOR CUFF, UNSPECIFIED WHETHER TRAUMATIC: ICD-10-CM

## 2023-03-02 DIAGNOSIS — Z98.890 S/P ROTATOR CUFF REPAIR: Primary | ICD-10-CM

## 2023-03-02 PROCEDURE — 3074F PR MOST RECENT SYSTOLIC BLOOD PRESSURE < 130 MM HG: ICD-10-PCS | Mod: CPTII,,, | Performed by: PHYSICIAN ASSISTANT

## 2023-03-02 PROCEDURE — 1160F RVW MEDS BY RX/DR IN RCRD: CPT | Mod: CPTII,,, | Performed by: PHYSICIAN ASSISTANT

## 2023-03-02 PROCEDURE — 1101F PT FALLS ASSESS-DOCD LE1/YR: CPT | Mod: CPTII,,, | Performed by: PHYSICIAN ASSISTANT

## 2023-03-02 PROCEDURE — 3008F BODY MASS INDEX DOCD: CPT | Mod: CPTII,,, | Performed by: PHYSICIAN ASSISTANT

## 2023-03-02 PROCEDURE — 3078F PR MOST RECENT DIASTOLIC BLOOD PRESSURE < 80 MM HG: ICD-10-PCS | Mod: CPTII,,, | Performed by: PHYSICIAN ASSISTANT

## 2023-03-02 PROCEDURE — 99213 PR OFFICE/OUTPT VISIT, EST, LEVL III, 20-29 MIN: ICD-10-PCS | Mod: ,,, | Performed by: PHYSICIAN ASSISTANT

## 2023-03-02 PROCEDURE — 99213 OFFICE O/P EST LOW 20 MIN: CPT | Mod: ,,, | Performed by: PHYSICIAN ASSISTANT

## 2023-03-02 PROCEDURE — 1159F MED LIST DOCD IN RCRD: CPT | Mod: CPTII,,, | Performed by: PHYSICIAN ASSISTANT

## 2023-03-02 PROCEDURE — 3078F DIAST BP <80 MM HG: CPT | Mod: CPTII,,, | Performed by: PHYSICIAN ASSISTANT

## 2023-03-02 PROCEDURE — 1160F PR REVIEW ALL MEDS BY PRESCRIBER/CLIN PHARMACIST DOCUMENTED: ICD-10-PCS | Mod: CPTII,,, | Performed by: PHYSICIAN ASSISTANT

## 2023-03-02 PROCEDURE — 1159F PR MEDICATION LIST DOCUMENTED IN MEDICAL RECORD: ICD-10-PCS | Mod: CPTII,,, | Performed by: PHYSICIAN ASSISTANT

## 2023-03-02 PROCEDURE — 3074F SYST BP LT 130 MM HG: CPT | Mod: CPTII,,, | Performed by: PHYSICIAN ASSISTANT

## 2023-03-02 PROCEDURE — 3008F PR BODY MASS INDEX (BMI) DOCUMENTED: ICD-10-PCS | Mod: CPTII,,, | Performed by: PHYSICIAN ASSISTANT

## 2023-03-02 PROCEDURE — 3288F FALL RISK ASSESSMENT DOCD: CPT | Mod: CPTII,,, | Performed by: PHYSICIAN ASSISTANT

## 2023-03-02 PROCEDURE — 1101F PR PT FALLS ASSESS DOC 0-1 FALLS W/OUT INJ PAST YR: ICD-10-PCS | Mod: CPTII,,, | Performed by: PHYSICIAN ASSISTANT

## 2023-03-02 PROCEDURE — 3288F PR FALLS RISK ASSESSMENT DOCUMENTED: ICD-10-PCS | Mod: CPTII,,, | Performed by: PHYSICIAN ASSISTANT

## 2023-03-02 NOTE — PROGRESS NOTES
"Chief Complaint:   Chief Complaint   Patient presents with    Follow-up     4.5mth s/p rt shoulder sx 10/21/22 so far he states is doing well. certain exercises will vary       History of present illness:    This is a 67 y.o. year old male who complains of follow-up from a right shoulder arthroscopy rotator cuff repair.    Patient is making improvements attending physical therapy and his strength and range of motion improving.    Patient does still have some limitation with abduction overhead activities and some mild pain although he continued to make steady improvement    Review of Systems:    Constitution:   Denies chills, fever, and sweats.  HENT:   Denies headaches or blurry vision.  Cardiovascular:  Denies chest pain or irregular heart beat.  Respiratory:   Denies cough or shortness of breath.  Gastrointestinal:  Denies abdominal pain, nausea, or vomiting.  Musculoskeletal:   Denies muscle cramps.  Neurological:   Denies dizziness or focal weakness.  Psychiatric/Behavior: Normal mental status.  Hematology/Lymph:  Denies bleeding problem or easy bruising/bleeding.  Skin:    Denies rash or suspicious lesions.    Examination:    Vital Signs:    Vitals:    03/02/23 0859   BP: 129/68   Pulse: 62   Weight: 99.2 kg (218 lb 12.8 oz)   Height: 5' 1" (1.549 m)       Body mass index is 41.34 kg/m².    Constitution:   Well-developed, well nourished patient in no acute distress.  Neurological:   Alert and oriented x 3 and cooperative to examination.     Psychiatric/Behavior: Normal mental status.  Respiratory:   No shortness of breath.  Eyes:    Extraoccular muscles intact  Skin:    No scars, rash or suspicious lesions.    Physical Exam:   Right shoulder exam   Patient has well-healed arthroscopy portals.    Range of motion with forward flexion to 165°.    external rotation 40° internal rotation 30°.    patient has abduction actively to approximately 75°.    Elbow wrist and fingers normal range of motion.    motor and " sensation intact of the right upper extremity       Assessment: S/P rotator cuff repair    Complete tear of right rotator cuff, unspecified whether traumatic         Plan:  This point the patient will continue physical therapy for another month at which point he will be 6 months postop  He will follow up here for repeat evaluation in 1 month's time and at that time determination will be made on return to work situation.    Patient remain no work for the next month until his next appointment at which time he will be evaluated for potential return to work status and work restrictions if any          DISCLAIMER: This note may have been dictated using voice recognition software and may contain grammatical errors.     NOTE: Consult report sent to referring provider via [employer].

## 2023-03-02 NOTE — LETTER
[address]  Phone: [phone]  Fax: [phone]      Patient Name:Arsenio Kang  YOB: 1955    Date:03/02/23     To whomever this may concern,       The above mentioned patient was seen by me on 03/02/23 for a follow up visit following his recent shoulder surgery. At this time Mr. Kang is not cleared to return to work and this will be pending until his follow up visit on 04/03/23 with Dr. Villatoro. Should you have any concerns or questions please feel free to reach out at [phone].         KAYLA Nguyen

## 2023-03-15 ENCOUNTER — OFFICE VISIT (OUTPATIENT)
Dept: ORTHOPEDICS | Facility: CLINIC | Age: 68
End: 2023-03-15
Payer: COMMERCIAL

## 2023-03-15 VITALS
DIASTOLIC BLOOD PRESSURE: 63 MMHG | HEIGHT: 61 IN | WEIGHT: 215 LBS | TEMPERATURE: 97 F | SYSTOLIC BLOOD PRESSURE: 118 MMHG | BODY MASS INDEX: 40.59 KG/M2 | HEART RATE: 53 BPM

## 2023-03-15 DIAGNOSIS — Z98.890 S/P ROTATOR CUFF REPAIR: ICD-10-CM

## 2023-03-15 DIAGNOSIS — M17.12 PRIMARY OSTEOARTHRITIS OF LEFT KNEE: ICD-10-CM

## 2023-03-15 DIAGNOSIS — M17.11 PRIMARY OSTEOARTHRITIS OF RIGHT KNEE: Primary | ICD-10-CM

## 2023-03-15 PROCEDURE — 99214 OFFICE O/P EST MOD 30 MIN: CPT | Mod: 25,,, | Performed by: REHABILITATION UNIT

## 2023-03-15 PROCEDURE — 20610 LARGE JOINT ASPIRATION/INJECTION: BILATERAL KNEE: ICD-10-PCS | Mod: 50,,, | Performed by: REHABILITATION UNIT

## 2023-03-15 PROCEDURE — 1159F MED LIST DOCD IN RCRD: CPT | Mod: CPTII,,, | Performed by: REHABILITATION UNIT

## 2023-03-15 PROCEDURE — 99214 PR OFFICE/OUTPT VISIT, EST, LEVL IV, 30-39 MIN: ICD-10-PCS | Mod: 25,,, | Performed by: REHABILITATION UNIT

## 2023-03-15 PROCEDURE — 1101F PT FALLS ASSESS-DOCD LE1/YR: CPT | Mod: CPTII,,, | Performed by: REHABILITATION UNIT

## 2023-03-15 PROCEDURE — 3288F PR FALLS RISK ASSESSMENT DOCUMENTED: ICD-10-PCS | Mod: CPTII,,, | Performed by: REHABILITATION UNIT

## 2023-03-15 PROCEDURE — 3008F BODY MASS INDEX DOCD: CPT | Mod: CPTII,,, | Performed by: REHABILITATION UNIT

## 2023-03-15 PROCEDURE — 3078F PR MOST RECENT DIASTOLIC BLOOD PRESSURE < 80 MM HG: ICD-10-PCS | Mod: CPTII,,, | Performed by: REHABILITATION UNIT

## 2023-03-15 PROCEDURE — 1159F PR MEDICATION LIST DOCUMENTED IN MEDICAL RECORD: ICD-10-PCS | Mod: CPTII,,, | Performed by: REHABILITATION UNIT

## 2023-03-15 PROCEDURE — 3074F PR MOST RECENT SYSTOLIC BLOOD PRESSURE < 130 MM HG: ICD-10-PCS | Mod: CPTII,,, | Performed by: REHABILITATION UNIT

## 2023-03-15 PROCEDURE — 3078F DIAST BP <80 MM HG: CPT | Mod: CPTII,,, | Performed by: REHABILITATION UNIT

## 2023-03-15 PROCEDURE — 20610 DRAIN/INJ JOINT/BURSA W/O US: CPT | Mod: 50,,, | Performed by: REHABILITATION UNIT

## 2023-03-15 PROCEDURE — 3074F SYST BP LT 130 MM HG: CPT | Mod: CPTII,,, | Performed by: REHABILITATION UNIT

## 2023-03-15 PROCEDURE — 3008F PR BODY MASS INDEX (BMI) DOCUMENTED: ICD-10-PCS | Mod: CPTII,,, | Performed by: REHABILITATION UNIT

## 2023-03-15 PROCEDURE — 3288F FALL RISK ASSESSMENT DOCD: CPT | Mod: CPTII,,, | Performed by: REHABILITATION UNIT

## 2023-03-15 PROCEDURE — 1101F PR PT FALLS ASSESS DOC 0-1 FALLS W/OUT INJ PAST YR: ICD-10-PCS | Mod: CPTII,,, | Performed by: REHABILITATION UNIT

## 2023-03-15 RX ORDER — ATENOLOL 50 MG/1
50 TABLET ORAL
COMMUNITY
Start: 2023-02-23 | End: 2023-10-10

## 2023-03-15 RX ADMIN — LIDOCAINE HYDROCHLORIDE 2 MG: 20 INJECTION, SOLUTION INFILTRATION; PERINEURAL at 10:03

## 2023-03-15 RX ADMIN — BETAMETHASONE SODIUM PHOSPHATE AND BETAMETHASONE ACETATE 12 MG: 3; 3 INJECTION, SUSPENSION INTRA-ARTICULAR; INTRALESIONAL; INTRAMUSCULAR; SOFT TISSUE at 10:03

## 2023-03-15 NOTE — PROGRESS NOTES
Subjective:      Patient ID: Arsenio Kang is a 67 y.o. male.    Chief Complaint: Injections of the Right Knee (4 month f/u from cortisone injection. Reports decrease in pain. States the relief lasted for a few month. Complains of swelling in ankles. Ambulates without assistance. No complaints of shoulder pain )    Date of procedure: 10/21/22    Procedure:  1. Right shoulder diagnostic arthroscopy   2. Arthroscopic debridement of residual biceps tendon stump, synovium, subacromial bursa   3. Arthroscopic rotator cuff repair    Arsenio Kang returns to the clinic today for follow up examination.  He is had bilateral knee injections in the past which provided excellent pain relief for around 2 months.  He is interested in repeat injections today.  He is working on motion.  He is also status post the above procedure to his shoulder.  He continues to be in therapy and make progress with his motion and strength.  He is still not functionally able to return to his physically demanding job.      Review of Systems  Comprehensive review of systems completed and negative except as per HPI.        Objective:     Vitals:    03/15/23 1020   BP: 118/63   Pulse: (!) 53   Temp: 97.4 °F (36.3 °C)         General: well-developed well-nourished in no acute distress    Physical Exam:  Right shoulder  Incisions are well healed with no erythema, fluctuance, induration, drainage or external signs of infection.    No gross swelling or epitrochlear lymphadenopathy appreciated  AROM , Abd 110, ER 70  Firing deltoid. Full range of motion of the elbow and distally.  Sensation grossly intact to all dermatomal distributions  No neurological deficits appreciated   Palpable radial pulse    bilateral KNEE:     Alignment: varus  Appearance: skin in intact without lesion  Effusion: none  Gait: antalgic  Straight Leg Raise: negative  Log Roll: negative  Hip ROM: full and painless  Ankle ROM: full and painless   Knee ROM:  2 -  115  Tenderness: diffuse TTP    Patellar Mobility: decreased  Patellar grind: +  PF Crepitus:+       Valgus Stress @ 0 deg: stable  Valgus Stress @ 30 deg: stable  Varus Stress @ 0 deg: stable  Varus Stress @ 30 deg: stable  Lachman: stable  Ant Drawer: negative   Post Drawer: negative  Posterior Sag Sign: negative  Neurological deficits: SILT dp/sp/t distributions  Motor: 5/5 EHL/FHL/TA/GS  Bilateral pitting edema to the knee    Warm well perfused lower extremity with capillary refill less than 2 seconds and sensation intact to light touch in the terminal nerve distributions. Calf soft and easily compressible without clinical sign of DVT. No palpable popliteal lymphadenopathy    Imaging:  Four view radiographs of the bilateral knees previously obtained show no acute fractures or dislocations.  Advanced tricompartmental osteoarthritic changes with loss of joint space and bone-on-bone arthritis most severe in the medial compartment.  There are surrounding osteophytes with subchondral sclerosis.  Varus alignment.    Large Joint Aspiration/Injection: bilateral knee    Date/Time: 3/15/2023 10:15 AM  Performed by: Prince Villatoro MD  Authorized by: Prince Villatoro MD     Consent Done?:  Yes (Verbal)  Indications:  Pain and arthritis  Site marked: the procedure site was marked    Timeout: prior to procedure the correct patient, procedure, and site was verified    Prep: patient was prepped and draped in usual sterile fashion    Local anesthesia used?: No      Details:  Needle Size:  21 G  Ultrasonic Guidance for needle placement?: No    Approach:  Anterolateral  Location:  Knee  Laterality:  Bilateral  Site:  Bilateral knee  Medications (Right):  2 mg LIDOcaine HCL 20 mg/ml (2%) 20 mg/mL (2 %); 12 mg betamethasone acetate-betamethasone sodium phosphate 6 mg/mL  Medications (Left):  2 mg LIDOcaine HCL 20 mg/ml (2%) 20 mg/mL (2 %); 12 mg betamethasone acetate-betamethasone sodium phosphate 6 mg/mL  Patient tolerance:   Patient tolerated the procedure well with no immediate complications          Assessment:       Encounter Diagnoses   Name Primary?    Primary osteoarthritis of right knee Yes    Primary osteoarthritis of left knee     S/P rotator cuff repair              Plan:       Arsenio was seen today for injections.    Diagnoses and all orders for this visit:    Primary osteoarthritis of right knee    Primary osteoarthritis of left knee    S/P rotator cuff repair      s/p above stated procedure; doing well       PT - continue per protocol; progress motion and strength   Pain control - continue ice and meds. Risks of medications discussed  Progress activity   OTC meds. Home exercises. Weight loss again encouraged for his knees.  We did discuss a BMI target of less than 40 when he is ready for total knee arthroplasty  Keep scheduled follow-up for specific exam of his right shoulder  Continue off work  Bilateral knee CSI was provided as above.  Post-injection care discussed.  He may elect for viscosupplementation in the future.    Follow-up: Arsenio Kang to follow up as scheduled for his shoulder. If there are any questions prior to this, the patient was instructed to contact the office.

## 2023-03-16 RX ORDER — BETAMETHASONE SODIUM PHOSPHATE AND BETAMETHASONE ACETATE 3; 3 MG/ML; MG/ML
12 INJECTION, SUSPENSION INTRA-ARTICULAR; INTRALESIONAL; INTRAMUSCULAR; SOFT TISSUE
Status: DISCONTINUED | OUTPATIENT
Start: 2023-03-15 | End: 2023-03-16 | Stop reason: HOSPADM

## 2023-03-16 RX ORDER — LIDOCAINE HYDROCHLORIDE 20 MG/ML
2 INJECTION, SOLUTION INFILTRATION; PERINEURAL
Status: DISCONTINUED | OUTPATIENT
Start: 2023-03-15 | End: 2023-03-16 | Stop reason: HOSPADM

## 2023-04-03 ENCOUNTER — OFFICE VISIT (OUTPATIENT)
Dept: ORTHOPEDICS | Facility: CLINIC | Age: 68
End: 2023-04-03
Payer: COMMERCIAL

## 2023-04-03 DIAGNOSIS — Z98.890 S/P ROTATOR CUFF REPAIR: Primary | ICD-10-CM

## 2023-04-03 PROCEDURE — 3288F PR FALLS RISK ASSESSMENT DOCUMENTED: ICD-10-PCS | Mod: CPTII,,, | Performed by: REHABILITATION UNIT

## 2023-04-03 PROCEDURE — 3288F FALL RISK ASSESSMENT DOCD: CPT | Mod: CPTII,,, | Performed by: REHABILITATION UNIT

## 2023-04-03 PROCEDURE — 99213 OFFICE O/P EST LOW 20 MIN: CPT | Mod: ,,, | Performed by: REHABILITATION UNIT

## 2023-04-03 PROCEDURE — 1159F PR MEDICATION LIST DOCUMENTED IN MEDICAL RECORD: ICD-10-PCS | Mod: CPTII,,, | Performed by: REHABILITATION UNIT

## 2023-04-03 PROCEDURE — 1101F PR PT FALLS ASSESS DOC 0-1 FALLS W/OUT INJ PAST YR: ICD-10-PCS | Mod: CPTII,,, | Performed by: REHABILITATION UNIT

## 2023-04-03 PROCEDURE — 1101F PT FALLS ASSESS-DOCD LE1/YR: CPT | Mod: CPTII,,, | Performed by: REHABILITATION UNIT

## 2023-04-03 PROCEDURE — 99213 PR OFFICE/OUTPT VISIT, EST, LEVL III, 20-29 MIN: ICD-10-PCS | Mod: ,,, | Performed by: REHABILITATION UNIT

## 2023-04-03 PROCEDURE — 1159F MED LIST DOCD IN RCRD: CPT | Mod: CPTII,,, | Performed by: REHABILITATION UNIT

## 2023-04-03 NOTE — LETTER
April 3, 2023    Arsenio Kang  [address] [facility] [address]          [facility]  [address] [place] [address], [address]  [facility] [address]  Phone: [phone]  Fax: [phone] April 3, 2023     Patient: Arsenio Kang   YOB: 1955   Date of Visit: 4/3/2023       To Whom It May Concern:    It is my medical opinion that Arsenio Kang may return back to duty on 04/10/23 with restrictions of no overhead repeative motions. If you have any questions or concerns, please don't hesitate to call.    Sincerely,        Prince Villatoro MD/SULEMAN

## 2023-04-03 NOTE — PROGRESS NOTES
Subjective:      Patient ID: Arsenio Kang is a 67 y.o. male.    Chief Complaint: Post-op Evaluation of the Right Shoulder (Follow up ATS R shoulder w/ rc repair sx 10/21/22. Pain is a 0/10. Patient finished  PT thursday. GL 1/19/2023. Knee injections given 11/16/22. States injections did help.//)    Date of procedure: 10/21/22    Procedure:  1. Right shoulder diagnostic arthroscopy   2. Arthroscopic debridement of residual biceps tendon stump, synovium, subacromial bursa   3. Arthroscopic rotator cuff repair    Arsenio Kang returns to the clinic today for follow up examination.  He is also status post the above procedure to his shoulder.  He has completed physical therapy.  He has made excellent motion progress and drink progress.  No pain reported.  Knees are doing well after injections.      Review of Systems  Comprehensive review of systems completed and negative except as per HPI.        Objective:     There were no vitals filed for this visit.        General: well-developed well-nourished in no acute distress    Physical Exam:  Right shoulder  Incisions are well healed with no erythema, fluctuance, induration, drainage or external signs of infection.    No gross swelling or epitrochlear lymphadenopathy appreciated  AROM , Abd 130, ER 70  Rotator cuff strength is intact  Firing deltoid. Full range of motion of the elbow and distally.  Sensation grossly intact to all dermatomal distributions  No neurological deficits appreciated   Palpable radial pulse        Assessment:       Encounter Diagnosis   Name Primary?    S/P rotator cuff repair Yes               Plan:       Arsenio was seen today for post-op evaluation.    Diagnoses and all orders for this visit:    S/P rotator cuff repair        s/p above stated procedure; doing well       PT - continue home exercises  Pain control - continue ice and meds. Risks of medications discussed  Progress activity.  Work release for this coming Monday.  No  repetitive overhead activities.    OTC meds. Home exercises. Weight loss again encouraged for his knees.  We did discuss a BMI target of less than 40 when he is ready for total knee arthroplasty    Follow-up: Arsenio Kang to follow up as scheduled for his shoulder in 2 months with full release possible if doing well. If there are any questions prior to this, the patient was instructed to contact the office.

## 2023-04-18 ENCOUNTER — TELEPHONE (OUTPATIENT)
Dept: ORTHOPEDICS | Facility: CLINIC | Age: 68
End: 2023-04-18
Payer: COMMERCIAL

## 2023-04-18 DIAGNOSIS — M17.11 PRIMARY OSTEOARTHRITIS OF RIGHT KNEE: Primary | ICD-10-CM

## 2023-04-18 DIAGNOSIS — M17.12 PRIMARY OSTEOARTHRITIS OF LEFT KNEE: ICD-10-CM

## 2023-04-18 NOTE — TELEPHONE ENCOUNTER
Pt's daughter called and LVM  stating Mr. Kang would like to go ahead with the visco supplemention injections in both knees.

## 2023-04-18 NOTE — TELEPHONE ENCOUNTER
Called patient's daughter back and left a voice mail stating her dad would be eligible for the synvisc or visco injections. Also advised in vm order is being put in for injections to get authorization started and to give me a call back to get dad on the books to receive injections in both knees.

## 2023-04-25 ENCOUNTER — TELEPHONE (OUTPATIENT)
Dept: ORTHOPEDICS | Facility: CLINIC | Age: 68
End: 2023-04-25
Payer: COMMERCIAL

## 2023-04-25 NOTE — TELEPHONE ENCOUNTER
PT's daughter had called returning previous phone call regarding synvisc injections for her father. Daughter wanting to know date and time for injections.

## 2023-05-02 DIAGNOSIS — M17.0 PRIMARY OSTEOARTHRITIS OF BOTH KNEES: Primary | ICD-10-CM

## 2023-05-02 RX ORDER — HYALURONATE SODIUM, STABILIZED 60 MG/3 ML
60 SYRINGE (ML) INTRAARTICULAR SEE ADMIN INSTRUCTIONS
Qty: 6 ML | Refills: 0 | Status: SHIPPED | OUTPATIENT
Start: 2023-05-02

## 2023-05-22 ENCOUNTER — OFFICE VISIT (OUTPATIENT)
Dept: ORTHOPEDICS | Facility: CLINIC | Age: 68
End: 2023-05-22
Payer: COMMERCIAL

## 2023-05-22 VITALS
HEART RATE: 65 BPM | WEIGHT: 215 LBS | DIASTOLIC BLOOD PRESSURE: 62 MMHG | TEMPERATURE: 97 F | BODY MASS INDEX: 40.59 KG/M2 | HEIGHT: 61 IN | SYSTOLIC BLOOD PRESSURE: 129 MMHG

## 2023-05-22 DIAGNOSIS — M17.0 PRIMARY OSTEOARTHRITIS OF BOTH KNEES: Primary | ICD-10-CM

## 2023-05-22 PROCEDURE — 99499 UNLISTED E&M SERVICE: CPT | Mod: ,,, | Performed by: REHABILITATION UNIT

## 2023-05-22 PROCEDURE — 20610 DRAIN/INJ JOINT/BURSA W/O US: CPT | Mod: 50,,, | Performed by: REHABILITATION UNIT

## 2023-05-22 PROCEDURE — 99499 NO LOS: ICD-10-PCS | Mod: ,,, | Performed by: REHABILITATION UNIT

## 2023-05-22 PROCEDURE — 20610 LARGE JOINT ASPIRATION/INJECTION: BILATERAL KNEE: ICD-10-PCS | Mod: 50,,, | Performed by: REHABILITATION UNIT

## 2023-05-22 NOTE — LETTER
May 22, 2023    Arsenio Kang  [address] [facility] [address]              [facility]  Orthopedics  [address] [place] [address], [address]  [facility] [address]  Phone: [phone]  Fax: [phone]   May 22, 2023     Patient: Arsenio Kang   YOB: 1955   Date of Visit: 5/22/2023       To Whom it May Concern:    Arsenio Kang was seen in my clinic on 5/22/2023. He has a follow-up with me  on 06/05/23.   If you have any questions or concerns, please don't hesitate to call.    Sincerely,         Prince Villatoro MD

## 2023-05-22 NOTE — PROGRESS NOTES
Patient presents today for scheduled bilateral knee viscosupplementation.  Injections were provided as below and he tolerated these well.  Post-injection care was discussed.  Follow up as needed for his knees.    Large Joint Aspiration/Injection: bilateral knee    Date/Time: 5/22/2023 1:00 PM  Performed by: Prince Villatoro MD  Authorized by: Prince Villatoro MD     Consent Done?:  Yes (Verbal)  Indications:  Pain and arthritis  Site marked: the procedure site was marked    Timeout: prior to procedure the correct patient, procedure, and site was verified    Prep: patient was prepped and draped in usual sterile fashion      Local anesthesia used?: Yes    Local anesthetic:  Topical anesthetic and lidocaine 2% without epinephrine  Anesthetic total (ml):  8      Details:  Needle Size:  21 G and 18 G  Ultrasonic Guidance for needle placement?: No    Approach:  Lateral (superolateral)  Location:  Knee  Laterality:  Bilateral  Site:  Bilateral knee  Medications (Right):  60 mg hyaluronate sodium, stabilized (DUROLANE) 60 mg/3 mL  Medications (Left):  60 mg hyaluronate sodium, stabilized (DUROLANE) 60 mg/3 mL  Patient tolerance:  Patient tolerated the procedure well with no immediate complications

## 2023-06-05 ENCOUNTER — OFFICE VISIT (OUTPATIENT)
Dept: ORTHOPEDICS | Facility: CLINIC | Age: 68
End: 2023-06-05
Payer: COMMERCIAL

## 2023-06-05 VITALS
HEART RATE: 42 BPM | WEIGHT: 211 LBS | HEIGHT: 61 IN | BODY MASS INDEX: 39.84 KG/M2 | DIASTOLIC BLOOD PRESSURE: 70 MMHG | SYSTOLIC BLOOD PRESSURE: 135 MMHG

## 2023-06-05 DIAGNOSIS — Z98.890 S/P ROTATOR CUFF REPAIR: Primary | ICD-10-CM

## 2023-06-05 PROCEDURE — 1101F PR PT FALLS ASSESS DOC 0-1 FALLS W/OUT INJ PAST YR: ICD-10-PCS | Mod: CPTII,,, | Performed by: REHABILITATION UNIT

## 2023-06-05 PROCEDURE — 99213 OFFICE O/P EST LOW 20 MIN: CPT | Mod: ,,, | Performed by: REHABILITATION UNIT

## 2023-06-05 PROCEDURE — 3008F BODY MASS INDEX DOCD: CPT | Mod: CPTII,,, | Performed by: REHABILITATION UNIT

## 2023-06-05 PROCEDURE — 1159F PR MEDICATION LIST DOCUMENTED IN MEDICAL RECORD: ICD-10-PCS | Mod: CPTII,,, | Performed by: REHABILITATION UNIT

## 2023-06-05 PROCEDURE — 3075F SYST BP GE 130 - 139MM HG: CPT | Mod: CPTII,,, | Performed by: REHABILITATION UNIT

## 2023-06-05 PROCEDURE — 4010F ACE/ARB THERAPY RXD/TAKEN: CPT | Mod: CPTII,,, | Performed by: REHABILITATION UNIT

## 2023-06-05 PROCEDURE — 1101F PT FALLS ASSESS-DOCD LE1/YR: CPT | Mod: CPTII,,, | Performed by: REHABILITATION UNIT

## 2023-06-05 PROCEDURE — 3078F PR MOST RECENT DIASTOLIC BLOOD PRESSURE < 80 MM HG: ICD-10-PCS | Mod: CPTII,,, | Performed by: REHABILITATION UNIT

## 2023-06-05 PROCEDURE — 3078F DIAST BP <80 MM HG: CPT | Mod: CPTII,,, | Performed by: REHABILITATION UNIT

## 2023-06-05 PROCEDURE — 3075F PR MOST RECENT SYSTOLIC BLOOD PRESS GE 130-139MM HG: ICD-10-PCS | Mod: CPTII,,, | Performed by: REHABILITATION UNIT

## 2023-06-05 PROCEDURE — 4010F PR ACE/ARB THEARPY RXD/TAKEN: ICD-10-PCS | Mod: CPTII,,, | Performed by: REHABILITATION UNIT

## 2023-06-05 PROCEDURE — 1159F MED LIST DOCD IN RCRD: CPT | Mod: CPTII,,, | Performed by: REHABILITATION UNIT

## 2023-06-05 PROCEDURE — 3008F PR BODY MASS INDEX (BMI) DOCUMENTED: ICD-10-PCS | Mod: CPTII,,, | Performed by: REHABILITATION UNIT

## 2023-06-05 PROCEDURE — 3288F PR FALLS RISK ASSESSMENT DOCUMENTED: ICD-10-PCS | Mod: CPTII,,, | Performed by: REHABILITATION UNIT

## 2023-06-05 PROCEDURE — 3288F FALL RISK ASSESSMENT DOCD: CPT | Mod: CPTII,,, | Performed by: REHABILITATION UNIT

## 2023-06-05 PROCEDURE — 99213 PR OFFICE/OUTPT VISIT, EST, LEVL III, 20-29 MIN: ICD-10-PCS | Mod: ,,, | Performed by: REHABILITATION UNIT

## 2023-06-05 NOTE — LETTER
June 5, 2023    Arsenio Kang  [address] [facility] [address]          [facility]  [address] [place] [address], [address]  [facility] [address]  Phone: [phone]  Fax: [phone] June 5, 2023     Patient: Arsenio Kang   YOB: 1955   Date of Visit: 6/5/2023       To Whom It May Concern:    It is my medical opinion that Arsenio Kang may continue to work with the restricitons of no overhead repetitive motions.    If you have any questions or concerns, please don't hesitate to call.    Sincerely,        Prince Villatoro MD/SULEMAN

## 2023-06-05 NOTE — PROGRESS NOTES
Subjective:      Patient ID: Arsenio Kang is a 68 y.o. male.    Chief Complaint: Follow-up of the Right Shoulder and Follow-up (ATS Rt shoulder 2 RC repair sx 10/21/22 pt states shoulder hurt x2 in last 2 weeks took Aleve for pain, pain lasted all day, was working at onset of pain. taking Aleve PRN.)    Date of procedure: 10/21/22    Procedure:  1. Right shoulder diagnostic arthroscopy   2. Arthroscopic debridement of residual biceps tendon stump, synovium, subacromial bursa   3. Arthroscopic rotator cuff repair    Arsenio Kang returns to the clinic today for follow up examination.  He is status post the above procedure to his shoulder.  He has been working.  He reports a increase in shoulder pain over the past couple of weeks.  It was associated with an increase in activity.  It resolved with some anti-inflammatories and rest for a day.  He has been limited to no strenuous overhead work.  He has been tolerating these limitations well.  Pain is about the shoulder and worse with overhead resisted activity.      Review of Systems  Comprehensive review of systems completed and negative except as per HPI.        Objective:     Vitals:    06/05/23 0939   BP: 135/70   Pulse: (!) 42           General: well-developed well-nourished in no acute distress    Physical Exam:  Right shoulder  Incisions are well healed with no erythema, fluctuance, induration, drainage or external signs of infection.    No gross swelling or epitrochlear lymphadenopathy appreciated  AROM , Abd 150, ER 70  Rotator cuff strength is intact; with some pain to empty can testing  Firing deltoid. Full range of motion of the elbow and distally.  Sensation grossly intact to all dermatomal distributions  No neurological deficits appreciated   Palpable radial pulse        Assessment:       Encounter Diagnosis   Name Primary?    S/P rotator cuff repair Yes         Plan:       Arsenio was seen today for follow-up and follow-up.    Diagnoses and all  orders for this visit:    S/P rotator cuff repair    s/p above stated procedure; doing well       PT - continue home exercises; he is going to continue working on his motion and strength and endurance  Pain control - continue ice and meds. Risks of medications discussed  Progress activity.  Continue working with current restrictions.  No repetitive overhead activities.    OTC meds. Home exercises.     Follow-up: Arsenio Kang to follow up for his shoulder in 2-3 months with full release possible if doing well and no exacerbation of his pain. If there are any questions prior to this, the patient was instructed to contact the office.

## 2023-06-13 LAB
CHOLEST SERPL-MSCNC: 225 MG/DL (ref 0–200)
HDLC SERPL-MCNC: 52 MG/DL (ref 35–70)
LDLC SERPL CALC-MCNC: 153 MG/DL (ref 0–160)
TRIGL SERPL-MCNC: 95 MG/DL (ref 40–160)

## 2023-08-23 ENCOUNTER — OFFICE VISIT (OUTPATIENT)
Dept: FAMILY MEDICINE | Facility: CLINIC | Age: 68
End: 2023-08-23
Payer: COMMERCIAL

## 2023-08-23 VITALS
OXYGEN SATURATION: 97 % | RESPIRATION RATE: 16 BRPM | TEMPERATURE: 99 F | WEIGHT: 213 LBS | BODY MASS INDEX: 40.22 KG/M2 | HEART RATE: 60 BPM | HEIGHT: 61 IN | DIASTOLIC BLOOD PRESSURE: 68 MMHG | SYSTOLIC BLOOD PRESSURE: 128 MMHG

## 2023-08-23 DIAGNOSIS — E66.01 CLASS 3 SEVERE OBESITY DUE TO EXCESS CALORIES WITH SERIOUS COMORBIDITY AND BODY MASS INDEX (BMI) OF 40.0 TO 44.9 IN ADULT: ICD-10-CM

## 2023-08-23 DIAGNOSIS — B35.1 ONYCHOMYCOSIS: ICD-10-CM

## 2023-08-23 DIAGNOSIS — G47.33 OSA TREATED WITH BIPAP: ICD-10-CM

## 2023-08-23 DIAGNOSIS — I10 PRIMARY HYPERTENSION: Primary | ICD-10-CM

## 2023-08-23 PROBLEM — E66.813 CLASS 3 SEVERE OBESITY DUE TO EXCESS CALORIES WITH SERIOUS COMORBIDITY AND BODY MASS INDEX (BMI) OF 40.0 TO 44.9 IN ADULT: Status: ACTIVE | Noted: 2023-08-23

## 2023-08-23 PROCEDURE — 1159F PR MEDICATION LIST DOCUMENTED IN MEDICAL RECORD: ICD-10-PCS | Mod: CPTII,,, | Performed by: FAMILY MEDICINE

## 2023-08-23 PROCEDURE — 1160F RVW MEDS BY RX/DR IN RCRD: CPT | Mod: CPTII,,, | Performed by: FAMILY MEDICINE

## 2023-08-23 PROCEDURE — 1126F AMNT PAIN NOTED NONE PRSNT: CPT | Mod: CPTII,,, | Performed by: FAMILY MEDICINE

## 2023-08-23 PROCEDURE — 4010F PR ACE/ARB THEARPY RXD/TAKEN: ICD-10-PCS | Mod: CPTII,,, | Performed by: FAMILY MEDICINE

## 2023-08-23 PROCEDURE — 3288F FALL RISK ASSESSMENT DOCD: CPT | Mod: CPTII,,, | Performed by: FAMILY MEDICINE

## 2023-08-23 PROCEDURE — 3078F DIAST BP <80 MM HG: CPT | Mod: CPTII,,, | Performed by: FAMILY MEDICINE

## 2023-08-23 PROCEDURE — 3074F PR MOST RECENT SYSTOLIC BLOOD PRESSURE < 130 MM HG: ICD-10-PCS | Mod: CPTII,,, | Performed by: FAMILY MEDICINE

## 2023-08-23 PROCEDURE — 3288F PR FALLS RISK ASSESSMENT DOCUMENTED: ICD-10-PCS | Mod: CPTII,,, | Performed by: FAMILY MEDICINE

## 2023-08-23 PROCEDURE — 3074F SYST BP LT 130 MM HG: CPT | Mod: CPTII,,, | Performed by: FAMILY MEDICINE

## 2023-08-23 PROCEDURE — 1101F PT FALLS ASSESS-DOCD LE1/YR: CPT | Mod: CPTII,,, | Performed by: FAMILY MEDICINE

## 2023-08-23 PROCEDURE — 3008F PR BODY MASS INDEX (BMI) DOCUMENTED: ICD-10-PCS | Mod: CPTII,,, | Performed by: FAMILY MEDICINE

## 2023-08-23 PROCEDURE — 3078F PR MOST RECENT DIASTOLIC BLOOD PRESSURE < 80 MM HG: ICD-10-PCS | Mod: CPTII,,, | Performed by: FAMILY MEDICINE

## 2023-08-23 PROCEDURE — 99214 OFFICE O/P EST MOD 30 MIN: CPT | Mod: ,,, | Performed by: FAMILY MEDICINE

## 2023-08-23 PROCEDURE — 1159F MED LIST DOCD IN RCRD: CPT | Mod: CPTII,,, | Performed by: FAMILY MEDICINE

## 2023-08-23 PROCEDURE — 99214 PR OFFICE/OUTPT VISIT, EST, LEVL IV, 30-39 MIN: ICD-10-PCS | Mod: ,,, | Performed by: FAMILY MEDICINE

## 2023-08-23 PROCEDURE — 3008F BODY MASS INDEX DOCD: CPT | Mod: CPTII,,, | Performed by: FAMILY MEDICINE

## 2023-08-23 PROCEDURE — 1126F PR PAIN SEVERITY QUANTIFIED, NO PAIN PRESENT: ICD-10-PCS | Mod: CPTII,,, | Performed by: FAMILY MEDICINE

## 2023-08-23 PROCEDURE — 4010F ACE/ARB THERAPY RXD/TAKEN: CPT | Mod: CPTII,,, | Performed by: FAMILY MEDICINE

## 2023-08-23 PROCEDURE — 1160F PR REVIEW ALL MEDS BY PRESCRIBER/CLIN PHARMACIST DOCUMENTED: ICD-10-PCS | Mod: CPTII,,, | Performed by: FAMILY MEDICINE

## 2023-08-23 PROCEDURE — 1101F PR PT FALLS ASSESS DOC 0-1 FALLS W/OUT INJ PAST YR: ICD-10-PCS | Mod: CPTII,,, | Performed by: FAMILY MEDICINE

## 2023-08-23 RX ORDER — CHLORTHALIDONE 25 MG/1
25 TABLET ORAL
COMMUNITY
Start: 2023-06-13 | End: 2023-08-23

## 2023-08-23 RX ORDER — NAPROXEN SODIUM 220 MG
220 TABLET ORAL 2 TIMES DAILY WITH MEALS
COMMUNITY

## 2023-08-23 NOTE — PROGRESS NOTES
"Subjective:        Patient ID: Arsenio Kang is a 68 y.o. male.    Chief Complaint: Establish Care (New patient est care /Former patient of Dr. Macdonald/Sees ortho Dr. Villatoro- rotator cuff repair )      Patient presents to clinic today to establish care with daughter, cherry, who is a nursing student.  Prev saw dr. Macdonald.  States did labs with him 6/2023.      He has htn.  Is back working again.  Was having low heart rate (in 40s) and was feeling dehydrated and dizzy.  Not taking chorthalidone (was taking am). Was taking atenolol 50mg but decreased dose to 25mg daily and hr has been better.  Currently taking lisinopril 40mg bid, norvasc 10mg bid and atenolol 25mg daily and asa.  History of murmur. Does not see cards.     He c/o toenails being thick and hard to cut.     He has guille and is on bipap.  Follows with dr. Haley for this.     He is obese    He had right shoulder surgery with dr. Villatoro 10/2022. Did PT.  Has follow up 9/6/23.  History of back surgery.     Never had colon cancer screening. Declines as this time.     He works as a .     He does not smoke. He is allergic to honey bees and poison ivy.         Review of Systems   Constitutional: Negative.    HENT: Negative.     Eyes: Negative.    Respiratory: Negative.     Cardiovascular: Negative.    Gastrointestinal: Negative.    Endocrine: Negative.    Genitourinary: Negative.    Musculoskeletal: Negative.    Skin: Negative.    Allergic/Immunologic: Negative.    Neurological: Negative.    Hematological: Negative.    Psychiatric/Behavioral: Negative.     All other systems reviewed and are negative.        Review of patient's allergies indicates:   Allergen Reactions    Poison ivy [vit e-nonoxynol 9-aloe vera]     Venom-honey bee       Vitals:    08/23/23 1434   BP: 128/68   BP Location: Left arm   Pulse: 60   Resp: 16   Temp: 99.1 °F (37.3 °C)   TempSrc: Temporal   SpO2: 97%   Weight: 96.6 kg (213 lb)   Height: 5' 1" (1.549 m)      Social " History     Socioeconomic History    Marital status: Unknown   Tobacco Use    Smoking status: Never    Smokeless tobacco: Never   Substance and Sexual Activity    Alcohol use: Not Currently    Drug use: Never    Sexual activity: Not Currently      Family History   Family history unknown: Yes          Objective:     Physical Exam  Vitals and nursing note reviewed.   Constitutional:       Appearance: Normal appearance. He is obese.   HENT:      Head: Normocephalic.      Nose: Nose normal.      Mouth/Throat:      Mouth: Mucous membranes are moist.      Pharynx: Oropharynx is clear.   Eyes:      Extraocular Movements: Extraocular movements intact.   Cardiovascular:      Rate and Rhythm: Normal rate and regular rhythm.   Pulmonary:      Effort: Pulmonary effort is normal.      Breath sounds: Normal breath sounds.   Musculoskeletal:         General: Normal range of motion.   Feet:      Right foot:      Toenail Condition: Right toenails are abnormally thick and long. Fungal disease present.     Left foot:      Toenail Condition: Left toenails are abnormally thick and long. Fungal disease present.  Skin:     General: Skin is warm and dry.   Neurological:      General: No focal deficit present.      Mental Status: He is alert and oriented to person, place, and time. Mental status is at baseline.   Psychiatric:         Mood and Affect: Mood normal.       Current Outpatient Medications on File Prior to Visit   Medication Sig Dispense Refill    amLODIPine (NORVASC) 10 MG tablet Take 10 mg by mouth once daily.      aspirin (ECOTRIN) 81 MG EC tablet Take 81 mg by mouth once daily.      atenoloL (TENORMIN) 50 MG tablet Take 50 mg by mouth.      cholecalciferol, vitamin D3, (D3-5000 ORAL) Take by mouth.      hyaluronate sodium, stabilized, DUROLANE, (DUROLANE) 60 mg/3 mL Inject 3 mLs (60 mg total) into the articular space As instructed (60 mg to be given in each knee intra articular). 6 mL 0    lisinopriL (PRINIVIL,ZESTRIL) 40 MG  tablet Take 40 mg by mouth once daily.      naproxen sodium (ANAPROX) 220 MG tablet Take 220 mg by mouth 2 (two) times daily with meals.      omega-3/dha/epa/fish oil (OMEGA-3 FISH OIL ORAL) Take by mouth.      UNABLE TO FIND medication name: NUGENIX TOTAL T      UNABLE TO FIND medication name: YELLOW HORNET EXTREME ENERGIZER      [DISCONTINUED] chlorthalidone (HYGROTEN) 25 MG Tab Take 25 mg by mouth.      [DISCONTINUED] gabapentin (NEURONTIN) 300 MG capsule Take 1 capsule (300 mg total) by mouth every 8 (eight) hours. for 5 days 15 capsule 0    [DISCONTINUED] HYDROcodone-acetaminophen (NORCO) 5-325 mg per tablet Take 1 tablet by mouth every 8 (eight) hours as needed for Pain. 15 tablet 0    [DISCONTINUED] meloxicam (MOBIC) 7.5 MG tablet Take 7.5 mg by mouth once daily.      [DISCONTINUED] traMADoL (ULTRAM) 50 mg tablet Take 1 tablet (50 mg total) by mouth every 6 (six) hours as needed for Pain. 20 tablet 0     No current facility-administered medications on file prior to visit.     Health Maintenance   Topic Date Due    Hepatitis C Screening  Never done    Lipid Panel  Never done    Colorectal Cancer Screening  Never done    TETANUS VACCINE  08/23/2024 (Originally [birth year]5/1973)    Shingles Vaccine (1 of 2) 08/23/2024 (Originally 5/5/2005)      Results for orders placed or performed during the hospital encounter of 10/21/22   Basic Metabolic Panel   Result Value Ref Range    Sodium Level 138 136 - 145 mmol/L    Potassium Level 4.1 3.5 - 5.1 mmol/L    Chloride 105 98 - 107 mmol/L    Carbon Dioxide 24 23 - 31 mmol/L    Glucose Level 94 82 - 115 mg/dL    Blood Urea Nitrogen 16.4 8.4 - 25.7 mg/dL    Creatinine 0.83 0.73 - 1.18 mg/dL    BUN/Creatinine Ratio 20     Calcium Level Total 10.0 8.8 - 10.0 mg/dL    Anion Gap 9.0 mEq/L    eGFR >60 mls/min/1.73/m2   CBC with Differential   Result Value Ref Range    WBC 6.8 4.5 - 11.5 x10(3)/mcL    RBC 5.04 4.70 - 6.10 x10(6)/mcL    Hgb 13.9 (L) 14.0 - 18.0 gm/dL    Hct 42.8 42.0 -  52.0 %    MCV 84.9 80.0 - 94.0 fL    MCH 27.6 27.0 - 31.0 pg    MCHC 32.5 (L) 33.0 - 36.0 mg/dL    RDW 13.8 11.5 - 17.0 %    Platelet 281 130 - 400 x10(3)/mcL    MPV 9.6 7.4 - 10.4 fL    Neut % 42.9 %    Lymph % 42.5 %    Mono % 10.1 %    Eos % 3.5 %    Basophil % 0.7 %    Lymph # 2.90 0.6 - 4.6 x10(3)/mcL    Neut # 2.9 2.1 - 9.2 x10(3)/mcL    Mono # 0.69 0.1 - 1.3 x10(3)/mcL    Eos # 0.24 0 - 0.9 x10(3)/mcL    Baso # 0.05 0 - 0.2 x10(3)/mcL    IG# 0.02 0 - 0.04 x10(3)/mcL    IG% 0.3 %    NRBC% 0.0 %          Assessment & Plan:     Active Problem List with Overview Notes    Diagnosis Date Noted    VIC treated with BiPAP 08/23/2023    Onychomycosis 08/23/2023    Class 3 severe obesity due to excess calories with serious comorbidity and body mass index (BMI) of 40.0 to 44.9 in adult 08/23/2023    Hypertension        1. Primary hypertension  Assessment & Plan:  States is taking lisinopril 40mg bid, norvasc 10mg bid and atenolol 25mg daily.  Advised to decrease to lisinopril 40mg daily, norvasc 10mg daily and continue on atenolol 25mg daily. Monitor bp at home.  bp log given. Will follow up in 1 month or sooner if needed. Labs ordered to be done prior to next appt. Contact clinic for any concerns. Patient and daughter are agreeable to plan and verbalized understanding.     Orders:  -     Comprehensive Metabolic Panel; Future; Expected date: 08/23/2023    2. VIC treated with BiPAP  Assessment & Plan:  Reports compliance and benefits from continued use. Follows with dr. harvey        3. Onychomycosis  Assessment & Plan:  Will send in for compounded rx foot soak at [facility]. Use as directed.       4. Class 3 severe obesity due to excess calories with serious comorbidity and body mass index (BMI) of 40.0 to 44.9 in adult  Assessment & Plan:  Encouraged lifestyle change           Follow up in about 4 weeks (around 9/20/2023) for BP check with BP log.

## 2023-08-23 NOTE — ASSESSMENT & PLAN NOTE
States is taking lisinopril 40mg bid, norvasc 10mg bid and atenolol 25mg daily.  Advised to decrease to lisinopril 40mg daily, norvasc 10mg daily and continue on atenolol 25mg daily. Monitor bp at home.  bp log given. Will follow up in 1 month or sooner if needed. Labs ordered to be done prior to next appt. Contact clinic for any concerns. Patient and daughter are agreeable to plan and verbalized understanding.

## 2023-08-24 ENCOUNTER — DOCUMENTATION ONLY (OUTPATIENT)
Dept: FAMILY MEDICINE | Facility: CLINIC | Age: 68
End: 2023-08-24
Payer: COMMERCIAL

## 2023-09-06 ENCOUNTER — OFFICE VISIT (OUTPATIENT)
Dept: ORTHOPEDICS | Facility: CLINIC | Age: 68
End: 2023-09-06
Payer: COMMERCIAL

## 2023-09-06 VITALS
BODY MASS INDEX: 40.22 KG/M2 | HEART RATE: 60 BPM | WEIGHT: 213 LBS | DIASTOLIC BLOOD PRESSURE: 64 MMHG | SYSTOLIC BLOOD PRESSURE: 127 MMHG | HEIGHT: 61 IN

## 2023-09-06 DIAGNOSIS — Z98.890 S/P ROTATOR CUFF REPAIR: Primary | ICD-10-CM

## 2023-09-06 PROCEDURE — 3074F SYST BP LT 130 MM HG: CPT | Mod: CPTII,,, | Performed by: REHABILITATION UNIT

## 2023-09-06 PROCEDURE — 1159F PR MEDICATION LIST DOCUMENTED IN MEDICAL RECORD: ICD-10-PCS | Mod: CPTII,,, | Performed by: REHABILITATION UNIT

## 2023-09-06 PROCEDURE — 4010F ACE/ARB THERAPY RXD/TAKEN: CPT | Mod: CPTII,,, | Performed by: REHABILITATION UNIT

## 2023-09-06 PROCEDURE — 3008F BODY MASS INDEX DOCD: CPT | Mod: CPTII,,, | Performed by: REHABILITATION UNIT

## 2023-09-06 PROCEDURE — 3078F DIAST BP <80 MM HG: CPT | Mod: CPTII,,, | Performed by: REHABILITATION UNIT

## 2023-09-06 PROCEDURE — 99213 PR OFFICE/OUTPT VISIT, EST, LEVL III, 20-29 MIN: ICD-10-PCS | Mod: ,,, | Performed by: REHABILITATION UNIT

## 2023-09-06 PROCEDURE — 99213 OFFICE O/P EST LOW 20 MIN: CPT | Mod: ,,, | Performed by: REHABILITATION UNIT

## 2023-09-06 PROCEDURE — 3008F PR BODY MASS INDEX (BMI) DOCUMENTED: ICD-10-PCS | Mod: CPTII,,, | Performed by: REHABILITATION UNIT

## 2023-09-06 PROCEDURE — 3074F PR MOST RECENT SYSTOLIC BLOOD PRESSURE < 130 MM HG: ICD-10-PCS | Mod: CPTII,,, | Performed by: REHABILITATION UNIT

## 2023-09-06 PROCEDURE — 3078F PR MOST RECENT DIASTOLIC BLOOD PRESSURE < 80 MM HG: ICD-10-PCS | Mod: CPTII,,, | Performed by: REHABILITATION UNIT

## 2023-09-06 PROCEDURE — 4010F PR ACE/ARB THEARPY RXD/TAKEN: ICD-10-PCS | Mod: CPTII,,, | Performed by: REHABILITATION UNIT

## 2023-09-06 PROCEDURE — 1159F MED LIST DOCD IN RCRD: CPT | Mod: CPTII,,, | Performed by: REHABILITATION UNIT

## 2023-09-06 NOTE — LETTER
[facility]  [address] [place] [address]. [address], [id number]  Phone: [phone]  Fax: [phone]    Name:Arsenio Kang  :1955   Date:2023       PATIENT IS ABLE TO RETURN TO WORK AS OF:23    [_] SEDENTARY WORK: Lifting 10 pounds maximum and occasionally lifting and/or carrying articles such as dockers, ledgers and small tools.  Although a sedentary job is defined as one which involved sitting, a certain amount of walking and standing are required only occasionally and other sedentary criteria are met.    [_] LIGHT WORK: Lifting 20 pounds with frequent lifting and/or carrying objects weighing up to 10 pounds.  Even though the weight lifted may be only a negotiable amount, a job is in the category when it involves sitting most of the time with a degree of pushing/pulling of arm and/or leg controls.    [_] MEDIUM WORK: Lifting of 50 pounds maximum with frequent lifting and/or carrying of objects up to 25 pounds.    [_] HEAVY WORK: Lifting of 100 pounds maximum with frequent lifting and/or carrying objects up to 50 pounds.    [_] VERY HEAVY WORK: Lifting objects in excess of 100 pounds with frequent lifting and/or carrying of objects weighing 50 pounds or more.    [XX] REGULAR DUTY: [XX] No Restrictions. [_] With Restrictions (See comments below0:    COMMENTS      Prince Villatoro MD

## 2023-09-06 NOTE — PROGRESS NOTES
Subjective:      Patient ID: Arsenio Kang is a 68 y.o. male.    Chief Complaint: Follow up R shoulder     Date of procedure: 10/21/22    Procedure:  1. Right shoulder diagnostic arthroscopy   2. Arthroscopic debridement of residual biceps tendon stump, synovium, subacromial bursa   3. Arthroscopic rotator cuff repair    Arsenio Kang returns to the clinic today for follow up examination.  He is status post the above procedure to his shoulder. He has been working. Occasional soreness associated with an increase in activity.  He has been limited to no strenuous overhead work.  He has been tolerating these limitations well.  Non sensorimotor changes.       Review of Systems  Comprehensive review of systems completed and negative except as per HPI.        Objective:     There were no vitals filed for this visit.    General: well-developed well-nourished in no acute distress    Physical Exam:  Right shoulder  Incisions are well healed with no erythema, fluctuance, induration, drainage or external signs of infection.    No gross swelling or epitrochlear lymphadenopathy appreciated  AROM , Abd 160, ER 80  Rotator cuff strength is intact   Firing deltoid. Full range of motion of the elbow and distally.  Sensation grossly intact to all dermatomal distributions  No neurological deficits appreciated   Palpable radial pulse        Assessment:       Encounter Diagnosis   Name Primary?    S/P rotator cuff repair Yes           Plan:       Diagnoses and all orders for this visit:    S/P rotator cuff repair      s/p above stated procedure; doing very well       Continue home exercises   Pain control - continue ice and meds. Risks of medications discussed  Full work release   OTC meds prn     Follow-up: Arsenio Kang to follow up for his shoulder in 2-3 months for final check in. If there are any questions prior to this, the patient was instructed to contact the office.

## 2023-10-03 ENCOUNTER — LAB VISIT (OUTPATIENT)
Dept: LAB | Facility: HOSPITAL | Age: 68
End: 2023-10-03
Attending: FAMILY MEDICINE
Payer: COMMERCIAL

## 2023-10-03 DIAGNOSIS — I10 PRIMARY HYPERTENSION: ICD-10-CM

## 2023-10-03 LAB
ALBUMIN SERPL-MCNC: 3.8 G/DL (ref 3.4–4.8)
ALBUMIN/GLOB SERPL: 1.2 RATIO (ref 1.1–2)
ALP SERPL-CCNC: 88 UNIT/L (ref 40–150)
ALT SERPL-CCNC: 19 UNIT/L (ref 0–55)
AST SERPL-CCNC: 21 UNIT/L (ref 5–34)
BILIRUB SERPL-MCNC: 1 MG/DL
BUN SERPL-MCNC: 13.8 MG/DL (ref 8.4–25.7)
CALCIUM SERPL-MCNC: 9.7 MG/DL (ref 8.8–10)
CHLORIDE SERPL-SCNC: 106 MMOL/L (ref 98–107)
CO2 SERPL-SCNC: 26 MMOL/L (ref 23–31)
CREAT SERPL-MCNC: 0.93 MG/DL (ref 0.73–1.18)
GFR SERPLBLD CREATININE-BSD FMLA CKD-EPI: >60 MLS/MIN/1.73/M2
GLOBULIN SER-MCNC: 3.1 GM/DL (ref 2.4–3.5)
GLUCOSE SERPL-MCNC: 94 MG/DL (ref 82–115)
POTASSIUM SERPL-SCNC: 4.2 MMOL/L (ref 3.5–5.1)
PROT SERPL-MCNC: 6.9 GM/DL (ref 5.8–7.6)
SODIUM SERPL-SCNC: 141 MMOL/L (ref 136–145)

## 2023-10-03 PROCEDURE — 80053 COMPREHEN METABOLIC PANEL: CPT

## 2023-10-03 PROCEDURE — 36415 COLL VENOUS BLD VENIPUNCTURE: CPT

## 2023-10-10 ENCOUNTER — OFFICE VISIT (OUTPATIENT)
Dept: FAMILY MEDICINE | Facility: CLINIC | Age: 68
End: 2023-10-10
Payer: COMMERCIAL

## 2023-10-10 VITALS
DIASTOLIC BLOOD PRESSURE: 76 MMHG | HEART RATE: 65 BPM | RESPIRATION RATE: 16 BRPM | WEIGHT: 216.69 LBS | BODY MASS INDEX: 40.91 KG/M2 | HEIGHT: 61 IN | OXYGEN SATURATION: 99 % | TEMPERATURE: 99 F | SYSTOLIC BLOOD PRESSURE: 138 MMHG

## 2023-10-10 DIAGNOSIS — E66.01 CLASS 3 SEVERE OBESITY DUE TO EXCESS CALORIES WITH SERIOUS COMORBIDITY AND BODY MASS INDEX (BMI) OF 40.0 TO 44.9 IN ADULT: ICD-10-CM

## 2023-10-10 DIAGNOSIS — I10 PRIMARY HYPERTENSION: Primary | ICD-10-CM

## 2023-10-10 DIAGNOSIS — R00.1 BRADYCARDIA: ICD-10-CM

## 2023-10-10 PROCEDURE — 1160F PR REVIEW ALL MEDS BY PRESCRIBER/CLIN PHARMACIST DOCUMENTED: ICD-10-PCS | Mod: CPTII,,, | Performed by: FAMILY MEDICINE

## 2023-10-10 PROCEDURE — 1126F PR PAIN SEVERITY QUANTIFIED, NO PAIN PRESENT: ICD-10-PCS | Mod: CPTII,,, | Performed by: FAMILY MEDICINE

## 2023-10-10 PROCEDURE — 3288F PR FALLS RISK ASSESSMENT DOCUMENTED: ICD-10-PCS | Mod: CPTII,,, | Performed by: FAMILY MEDICINE

## 2023-10-10 PROCEDURE — 1160F RVW MEDS BY RX/DR IN RCRD: CPT | Mod: CPTII,,, | Performed by: FAMILY MEDICINE

## 2023-10-10 PROCEDURE — 3075F PR MOST RECENT SYSTOLIC BLOOD PRESS GE 130-139MM HG: ICD-10-PCS | Mod: CPTII,,, | Performed by: FAMILY MEDICINE

## 2023-10-10 PROCEDURE — 3288F FALL RISK ASSESSMENT DOCD: CPT | Mod: CPTII,,, | Performed by: FAMILY MEDICINE

## 2023-10-10 PROCEDURE — 3008F BODY MASS INDEX DOCD: CPT | Mod: CPTII,,, | Performed by: FAMILY MEDICINE

## 2023-10-10 PROCEDURE — 3078F PR MOST RECENT DIASTOLIC BLOOD PRESSURE < 80 MM HG: ICD-10-PCS | Mod: CPTII,,, | Performed by: FAMILY MEDICINE

## 2023-10-10 PROCEDURE — 99214 OFFICE O/P EST MOD 30 MIN: CPT | Mod: ,,, | Performed by: FAMILY MEDICINE

## 2023-10-10 PROCEDURE — 1159F MED LIST DOCD IN RCRD: CPT | Mod: CPTII,,, | Performed by: FAMILY MEDICINE

## 2023-10-10 PROCEDURE — 4010F ACE/ARB THERAPY RXD/TAKEN: CPT | Mod: CPTII,,, | Performed by: FAMILY MEDICINE

## 2023-10-10 PROCEDURE — 1126F AMNT PAIN NOTED NONE PRSNT: CPT | Mod: CPTII,,, | Performed by: FAMILY MEDICINE

## 2023-10-10 PROCEDURE — 3008F PR BODY MASS INDEX (BMI) DOCUMENTED: ICD-10-PCS | Mod: CPTII,,, | Performed by: FAMILY MEDICINE

## 2023-10-10 PROCEDURE — 1101F PR PT FALLS ASSESS DOC 0-1 FALLS W/OUT INJ PAST YR: ICD-10-PCS | Mod: CPTII,,, | Performed by: FAMILY MEDICINE

## 2023-10-10 PROCEDURE — 4010F PR ACE/ARB THEARPY RXD/TAKEN: ICD-10-PCS | Mod: CPTII,,, | Performed by: FAMILY MEDICINE

## 2023-10-10 PROCEDURE — 3078F DIAST BP <80 MM HG: CPT | Mod: CPTII,,, | Performed by: FAMILY MEDICINE

## 2023-10-10 PROCEDURE — 1159F PR MEDICATION LIST DOCUMENTED IN MEDICAL RECORD: ICD-10-PCS | Mod: CPTII,,, | Performed by: FAMILY MEDICINE

## 2023-10-10 PROCEDURE — 1101F PT FALLS ASSESS-DOCD LE1/YR: CPT | Mod: CPTII,,, | Performed by: FAMILY MEDICINE

## 2023-10-10 PROCEDURE — 3075F SYST BP GE 130 - 139MM HG: CPT | Mod: CPTII,,, | Performed by: FAMILY MEDICINE

## 2023-10-10 PROCEDURE — 99214 PR OFFICE/OUTPT VISIT, EST, LEVL IV, 30-39 MIN: ICD-10-PCS | Mod: ,,, | Performed by: FAMILY MEDICINE

## 2023-10-10 RX ORDER — STREPTOMYCIN 1 G/1
INJECTION, POWDER, LYOPHILIZED, FOR SOLUTION INTRAMUSCULAR
COMMUNITY
Start: 2023-09-01

## 2023-10-10 RX ORDER — FUROSEMIDE 20 MG/1
10 TABLET ORAL DAILY
Qty: 15 TABLET | Refills: 11 | Status: SHIPPED | OUTPATIENT
Start: 2023-10-10 | End: 2023-11-01 | Stop reason: SDUPTHER

## 2023-10-10 NOTE — PROGRESS NOTES
Subjective:        Patient ID: Arsenio Kang is a 68 y.o. male.    Chief Complaint: Follow-up (BP check )      Patient presents to clinic with his daughter, cherry,  today for bp check.       He has htn.  Is back working again.  Currently taking lisinopril 40mg, norvasc 10mg, and atenolol 25mg daily and asa. Having low heart rate (in 40s) and was feeling dehydrated and dizzy. Previously on chorthalidone (was taking am). Was taking atenolol 50mg but decreased dose to 25mg daily and hr has been better.   History of murmur. Does not see cards.  He is here today for follow up.  Has bp log with him. Has some 140s-150s systolic but overall wnl.  HR upper 40s-50/60s.  Daughter reports some LE edema.  Does not wear compression socks.             He c/o toenails being thick and hard to cut.  Using foot spa and topical compounded foot soak.       He has guille and is on bipap.  Follows with dr. Haley for this.      He is obese     He had right shoulder surgery with dr. Villatoro 10/2022. Did PT.  Has follow up 9/6/23.  History of back surgery.      Never had colon cancer screening. Declines as this time.      He works as a .      He does not smoke. He is allergic to honey bees and poison ivy.             Review of Systems   Constitutional: Negative.    HENT: Negative.     Eyes: Negative.    Respiratory: Negative.     Cardiovascular: Negative.    Gastrointestinal: Negative.    Endocrine: Negative.    Genitourinary: Negative.    Musculoskeletal: Negative.    Skin: Negative.    Allergic/Immunologic: Negative.    Neurological: Negative.    Hematological: Negative.    Psychiatric/Behavioral: Negative.     All other systems reviewed and are negative.        Review of patient's allergies indicates:   Allergen Reactions    Poison ivy [vit e-nonoxynol 9-aloe vera]     Venom-honey bee       Vitals:    10/10/23 1500   BP: 138/76   BP Location: Left arm   Pulse: 65   Resp: 16   Temp: 98.5 °F (36.9 °C)   TempSrc: Temporal  "  SpO2: 99%   Weight: 98.3 kg (216 lb 11.2 oz)   Height: 5' 1" (1.549 m)      Social History     Socioeconomic History    Marital status: Unknown   Tobacco Use    Smoking status: Never    Smokeless tobacco: Never   Substance and Sexual Activity    Alcohol use: Not Currently    Drug use: Never    Sexual activity: Not Currently      Family History   Family history unknown: Yes          Objective:     Physical Exam  Vitals and nursing note reviewed.   Constitutional:       Appearance: Normal appearance. He is obese.   HENT:      Head: Normocephalic.      Nose: Nose normal.      Mouth/Throat:      Mouth: Mucous membranes are moist.      Pharynx: Oropharynx is clear.   Eyes:      Extraocular Movements: Extraocular movements intact.   Cardiovascular:      Rate and Rhythm: Normal rate and regular rhythm.   Pulmonary:      Effort: Pulmonary effort is normal.      Breath sounds: Normal breath sounds.   Musculoskeletal:         General: Normal range of motion.   Skin:     General: Skin is warm and dry.   Neurological:      General: No focal deficit present.      Mental Status: He is alert and oriented to person, place, and time. Mental status is at baseline.   Psychiatric:         Mood and Affect: Mood normal.       Current Outpatient Medications on File Prior to Visit   Medication Sig Dispense Refill    amLODIPine (NORVASC) 10 MG tablet Take 10 mg by mouth once daily.      aspirin (ECOTRIN) 81 MG EC tablet Take 81 mg by mouth once daily.      cholecalciferol, vitamin D3, (D3-5000 ORAL) Take by mouth.      hyaluronate sodium, stabilized, DUROLANE, (DUROLANE) 60 mg/3 mL Inject 3 mLs (60 mg total) into the articular space As instructed (60 mg to be given in each knee intra articular). 6 mL 0    lisinopriL (PRINIVIL,ZESTRIL) 40 MG tablet Take 40 mg by mouth once daily.      naproxen sodium (ANAPROX) 220 MG tablet Take 220 mg by mouth 2 (two) times daily with meals.      omega-3/dha/epa/fish oil (OMEGA-3 FISH OIL ORAL) Take by " mouth.      streptomycin 1 gram injection SMARTSIG:Topical      UNABLE TO FIND medication name: NUGENIX TOTAL T      UNABLE TO FIND medication name: YELLOW HORNET EXTREME ENERGIZER      UNABLE TO FIND Clindamycin/Mupirocin/Itraconazole 150/20/50mg topical capsule [88052]      UNABLE TO FIND SPRAY KIT 10ML      [DISCONTINUED] atenoloL (TENORMIN) 50 MG tablet Take 50 mg by mouth.       No current facility-administered medications on file prior to visit.     Health Maintenance   Topic Date Due    Hepatitis C Screening  Never done    TETANUS VACCINE  08/23/2024 (Originally 5/5/1973)    Shingles Vaccine (1 of 2) 08/23/2024 (Originally 5/5/2005)    Colorectal Cancer Screening  10/01/2024 (Originally 1955)    Lipid Panel  06/13/2028      Results for orders placed or performed in visit on 10/03/23   Comprehensive Metabolic Panel   Result Value Ref Range    Sodium Level 141 136 - 145 mmol/L    Potassium Level 4.2 3.5 - 5.1 mmol/L    Chloride 106 98 - 107 mmol/L    Carbon Dioxide 26 23 - 31 mmol/L    Glucose Level 94 82 - 115 mg/dL    Blood Urea Nitrogen 13.8 8.4 - 25.7 mg/dL    Creatinine 0.93 0.73 - 1.18 mg/dL    Calcium Level Total 9.7 8.8 - 10.0 mg/dL    Protein Total 6.9 5.8 - 7.6 gm/dL    Albumin Level 3.8 3.4 - 4.8 g/dL    Globulin 3.1 2.4 - 3.5 gm/dL    Albumin/Globulin Ratio 1.2 1.1 - 2.0 ratio    Bilirubin Total 1.0 <=1.5 mg/dL    Alkaline Phosphatase 88 40 - 150 unit/L    Alanine Aminotransferase 19 0 - 55 unit/L    Aspartate Aminotransferase 21 5 - 34 unit/L    eGFR >60 mls/min/1.73/m2          Assessment & Plan:     Active Problem List with Overview Notes    Diagnosis Date Noted    Bradycardia 10/10/2023    VIC treated with BiPAP 08/23/2023    Onychomycosis 08/23/2023    Class 3 severe obesity due to excess calories with serious comorbidity and body mass index (BMI) of 40.0 to 44.9 in adult 08/23/2023    Hypertension        1. Primary hypertension  Assessment & Plan:  Home bp log reviewed.     taking  lisinopril 40mg, norvasc 10mg, and atenolol 25mg daily.  Having bradycardia. Not symptomatic.  Will stop atenolol and replace with lasix 10mg daily due to report of LE edema.  Wear compression socks.  Elevate legs when sitting if possible.  Will get repeat labs prior to next appt.  Monitor bp at home.  bp log given.     Will follow up in 3 weeks or sooner if needed. Labs ordered to be done prior to next appt. Contact clinic for any concerns. Patient and daughter are agreeable to plan and verbalized understanding.     Orders:  -     furosemide (LASIX) 20 MG tablet; Take 0.5 tablets (10 mg total) by mouth once daily.  Dispense: 15 tablet; Refill: 11  -     Comprehensive Metabolic Panel; Future; Expected date: 10/10/2023    2. Class 3 severe obesity due to excess calories with serious comorbidity and body mass index (BMI) of 40.0 to 44.9 in adult  Assessment & Plan:  Encouraged lifestyle change    Orders:  -     furosemide (LASIX) 20 MG tablet; Take 0.5 tablets (10 mg total) by mouth once daily.  Dispense: 15 tablet; Refill: 11  -     Comprehensive Metabolic Panel; Future; Expected date: 10/10/2023    3. Bradycardia  Assessment & Plan:  See htn A&P  If HR not improved after stopping atenolol will get ekg    Orders:  -     Ambulatory referral/consult to Cardiology; Future; Expected date: 10/17/2023         Follow up in about 3 weeks (around 10/31/2023) for BP check with BP log.

## 2023-10-10 NOTE — ASSESSMENT & PLAN NOTE
Home bp log reviewed.     taking lisinopril 40mg, norvasc 10mg, and atenolol 25mg daily.  Having bradycardia. Not symptomatic.  Will stop atenolol and replace with lasix 10mg daily due to report of LE edema.  Wear compression socks.  Elevate legs when sitting if possible.  Will get repeat labs prior to next appt.  Monitor bp at home.  bp log given.     Will follow up in 3 weeks or sooner if needed. Labs ordered to be done prior to next appt. Contact clinic for any concerns. Patient and daughter are agreeable to plan and verbalized understanding.

## 2023-10-26 ENCOUNTER — LAB VISIT (OUTPATIENT)
Dept: LAB | Facility: HOSPITAL | Age: 68
End: 2023-10-26
Attending: REHABILITATION UNIT
Payer: COMMERCIAL

## 2023-10-26 DIAGNOSIS — E66.01 CLASS 3 SEVERE OBESITY DUE TO EXCESS CALORIES WITH SERIOUS COMORBIDITY AND BODY MASS INDEX (BMI) OF 40.0 TO 44.9 IN ADULT: ICD-10-CM

## 2023-10-26 DIAGNOSIS — I10 PRIMARY HYPERTENSION: ICD-10-CM

## 2023-10-26 DIAGNOSIS — M75.121 COMPLETE TEAR OF RIGHT ROTATOR CUFF, UNSPECIFIED WHETHER TRAUMATIC: ICD-10-CM

## 2023-10-26 LAB
ALBUMIN SERPL-MCNC: 4 G/DL (ref 3.4–4.8)
ALBUMIN/GLOB SERPL: 1.2 RATIO (ref 1.1–2)
ALP SERPL-CCNC: 84 UNIT/L (ref 40–150)
ALT SERPL-CCNC: 21 UNIT/L (ref 0–55)
AST SERPL-CCNC: 27 UNIT/L (ref 5–34)
BASOPHILS # BLD AUTO: 0.06 X10(3)/MCL
BASOPHILS NFR BLD AUTO: 0.8 %
BILIRUB SERPL-MCNC: 1.3 MG/DL
BUN SERPL-MCNC: 15.7 MG/DL (ref 8.4–25.7)
CALCIUM SERPL-MCNC: 10 MG/DL (ref 8.8–10)
CHLORIDE SERPL-SCNC: 104 MMOL/L (ref 98–107)
CO2 SERPL-SCNC: 23 MMOL/L (ref 23–31)
CREAT SERPL-MCNC: 1.11 MG/DL (ref 0.73–1.18)
EOSINOPHIL # BLD AUTO: 0.19 X10(3)/MCL (ref 0–0.9)
EOSINOPHIL NFR BLD AUTO: 2.5 %
ERYTHROCYTE [DISTWIDTH] IN BLOOD BY AUTOMATED COUNT: 13.7 % (ref 11.5–17)
GFR SERPLBLD CREATININE-BSD FMLA CKD-EPI: >60 MLS/MIN/1.73/M2
GLOBULIN SER-MCNC: 3.4 GM/DL (ref 2.4–3.5)
GLUCOSE SERPL-MCNC: 93 MG/DL (ref 82–115)
HCT VFR BLD AUTO: 45.2 % (ref 42–52)
HGB BLD-MCNC: 15.1 G/DL (ref 14–18)
IMM GRANULOCYTES # BLD AUTO: 0.02 X10(3)/MCL (ref 0–0.04)
IMM GRANULOCYTES NFR BLD AUTO: 0.3 %
LYMPHOCYTES # BLD AUTO: 3.06 X10(3)/MCL (ref 0.6–4.6)
LYMPHOCYTES NFR BLD AUTO: 39.7 %
MCH RBC QN AUTO: 28 PG (ref 27–31)
MCHC RBC AUTO-ENTMCNC: 33.4 G/DL (ref 33–36)
MCV RBC AUTO: 83.7 FL (ref 80–94)
MONOCYTES # BLD AUTO: 0.89 X10(3)/MCL (ref 0.1–1.3)
MONOCYTES NFR BLD AUTO: 11.5 %
NEUTROPHILS # BLD AUTO: 3.49 X10(3)/MCL (ref 2.1–9.2)
NEUTROPHILS NFR BLD AUTO: 45.2 %
NRBC BLD AUTO-RTO: 0 %
PLATELET # BLD AUTO: 308 X10(3)/MCL (ref 130–400)
PMV BLD AUTO: 10.3 FL (ref 7.4–10.4)
POTASSIUM SERPL-SCNC: 4.3 MMOL/L (ref 3.5–5.1)
PROT SERPL-MCNC: 7.4 GM/DL (ref 5.8–7.6)
RBC # BLD AUTO: 5.4 X10(6)/MCL (ref 4.7–6.1)
SODIUM SERPL-SCNC: 138 MMOL/L (ref 136–145)
WBC # SPEC AUTO: 7.71 X10(3)/MCL (ref 4.5–11.5)

## 2023-10-26 PROCEDURE — 80053 COMPREHEN METABOLIC PANEL: CPT

## 2023-10-26 PROCEDURE — 85025 COMPLETE CBC W/AUTO DIFF WBC: CPT

## 2023-10-26 PROCEDURE — 36415 COLL VENOUS BLD VENIPUNCTURE: CPT

## 2023-11-01 ENCOUNTER — OFFICE VISIT (OUTPATIENT)
Dept: FAMILY MEDICINE | Facility: CLINIC | Age: 68
End: 2023-11-01
Payer: COMMERCIAL

## 2023-11-01 VITALS
TEMPERATURE: 99 F | OXYGEN SATURATION: 98 % | HEIGHT: 61 IN | WEIGHT: 209.69 LBS | RESPIRATION RATE: 16 BRPM | HEART RATE: 65 BPM | SYSTOLIC BLOOD PRESSURE: 138 MMHG | BODY MASS INDEX: 39.59 KG/M2 | DIASTOLIC BLOOD PRESSURE: 80 MMHG

## 2023-11-01 DIAGNOSIS — Z12.5 PROSTATE CANCER SCREENING: ICD-10-CM

## 2023-11-01 DIAGNOSIS — E66.01 CLASS 2 SEVERE OBESITY DUE TO EXCESS CALORIES WITH SERIOUS COMORBIDITY AND BODY MASS INDEX (BMI) OF 39.0 TO 39.9 IN ADULT: ICD-10-CM

## 2023-11-01 DIAGNOSIS — R00.1 BRADYCARDIA: ICD-10-CM

## 2023-11-01 DIAGNOSIS — Z00.00 WELLNESS EXAMINATION: ICD-10-CM

## 2023-11-01 DIAGNOSIS — I10 PRIMARY HYPERTENSION: Primary | ICD-10-CM

## 2023-11-01 PROBLEM — E66.812 CLASS 2 SEVERE OBESITY DUE TO EXCESS CALORIES WITH SERIOUS COMORBIDITY AND BODY MASS INDEX (BMI) OF 39.0 TO 39.9 IN ADULT: Status: ACTIVE | Noted: 2023-08-23

## 2023-11-01 PROCEDURE — 1126F PR PAIN SEVERITY QUANTIFIED, NO PAIN PRESENT: ICD-10-PCS | Mod: CPTII,,, | Performed by: FAMILY MEDICINE

## 2023-11-01 PROCEDURE — 3079F PR MOST RECENT DIASTOLIC BLOOD PRESSURE 80-89 MM HG: ICD-10-PCS | Mod: CPTII,,, | Performed by: FAMILY MEDICINE

## 2023-11-01 PROCEDURE — 99214 OFFICE O/P EST MOD 30 MIN: CPT | Mod: ,,, | Performed by: FAMILY MEDICINE

## 2023-11-01 PROCEDURE — 1159F MED LIST DOCD IN RCRD: CPT | Mod: CPTII,,, | Performed by: FAMILY MEDICINE

## 2023-11-01 PROCEDURE — 1101F PR PT FALLS ASSESS DOC 0-1 FALLS W/OUT INJ PAST YR: ICD-10-PCS | Mod: CPTII,,, | Performed by: FAMILY MEDICINE

## 2023-11-01 PROCEDURE — 1160F RVW MEDS BY RX/DR IN RCRD: CPT | Mod: CPTII,,, | Performed by: FAMILY MEDICINE

## 2023-11-01 PROCEDURE — 1159F PR MEDICATION LIST DOCUMENTED IN MEDICAL RECORD: ICD-10-PCS | Mod: CPTII,,, | Performed by: FAMILY MEDICINE

## 2023-11-01 PROCEDURE — 4010F ACE/ARB THERAPY RXD/TAKEN: CPT | Mod: CPTII,,, | Performed by: FAMILY MEDICINE

## 2023-11-01 PROCEDURE — 3288F FALL RISK ASSESSMENT DOCD: CPT | Mod: CPTII,,, | Performed by: FAMILY MEDICINE

## 2023-11-01 PROCEDURE — 3075F SYST BP GE 130 - 139MM HG: CPT | Mod: CPTII,,, | Performed by: FAMILY MEDICINE

## 2023-11-01 PROCEDURE — 3075F PR MOST RECENT SYSTOLIC BLOOD PRESS GE 130-139MM HG: ICD-10-PCS | Mod: CPTII,,, | Performed by: FAMILY MEDICINE

## 2023-11-01 PROCEDURE — 3079F DIAST BP 80-89 MM HG: CPT | Mod: CPTII,,, | Performed by: FAMILY MEDICINE

## 2023-11-01 PROCEDURE — 3008F BODY MASS INDEX DOCD: CPT | Mod: CPTII,,, | Performed by: FAMILY MEDICINE

## 2023-11-01 PROCEDURE — 1126F AMNT PAIN NOTED NONE PRSNT: CPT | Mod: CPTII,,, | Performed by: FAMILY MEDICINE

## 2023-11-01 PROCEDURE — 3008F PR BODY MASS INDEX (BMI) DOCUMENTED: ICD-10-PCS | Mod: CPTII,,, | Performed by: FAMILY MEDICINE

## 2023-11-01 PROCEDURE — 1101F PT FALLS ASSESS-DOCD LE1/YR: CPT | Mod: CPTII,,, | Performed by: FAMILY MEDICINE

## 2023-11-01 PROCEDURE — 4010F PR ACE/ARB THEARPY RXD/TAKEN: ICD-10-PCS | Mod: CPTII,,, | Performed by: FAMILY MEDICINE

## 2023-11-01 PROCEDURE — 1160F PR REVIEW ALL MEDS BY PRESCRIBER/CLIN PHARMACIST DOCUMENTED: ICD-10-PCS | Mod: CPTII,,, | Performed by: FAMILY MEDICINE

## 2023-11-01 PROCEDURE — 3288F PR FALLS RISK ASSESSMENT DOCUMENTED: ICD-10-PCS | Mod: CPTII,,, | Performed by: FAMILY MEDICINE

## 2023-11-01 PROCEDURE — 99214 PR OFFICE/OUTPT VISIT, EST, LEVL IV, 30-39 MIN: ICD-10-PCS | Mod: ,,, | Performed by: FAMILY MEDICINE

## 2023-11-01 RX ORDER — FUROSEMIDE 20 MG/1
10 TABLET ORAL DAILY
Qty: 45 TABLET | Refills: 3 | Status: SHIPPED | OUTPATIENT
Start: 2023-11-01 | End: 2024-10-31

## 2023-11-01 RX ORDER — AMLODIPINE BESYLATE 10 MG/1
10 TABLET ORAL DAILY
Qty: 90 TABLET | Refills: 3 | Status: SHIPPED | OUTPATIENT
Start: 2023-11-01 | End: 2024-10-31

## 2023-11-01 RX ORDER — LISINOPRIL 40 MG/1
40 TABLET ORAL DAILY
Qty: 90 TABLET | Refills: 3 | Status: SHIPPED | OUTPATIENT
Start: 2023-11-01 | End: 2024-10-31

## 2023-11-01 NOTE — PROGRESS NOTES
Subjective:        Patient ID: Arsenio Kang is a 68 y.o. male.    Chief Complaint: Follow-up (BP follow up /Needs refills of amlodipine, lasix, and lisinopril)      Patient presents to clinic unaccompanied for for bp follow up.  He did labs prior to his appt and it was reviewed with patient at time of appt today.       He has htn.  Is back working again.  Currently taking lisinopril 40mg, norvasc 10mg, and atenolol 25mg daily and asa. Having low heart rate (in 40s) and was feeling dehydrated and dizzy. Previously on chorthalidone (was taking am). Was taking atenolol 50mg but decreased dose to 25mg daily and hr has been better.   History of murmur. Does not see cards.  At 10/10/23 appt, Has bp log with him. Has some 140s-150s systolic but overall wnl.  HR upper 40s-50/60s.  Daughter reports some LE edema.  Does not wear compression socks.  We stopped atenolol and started lasix 10mg daily.  He is here today for follow up. Has bp log.  Varies.  Feeling well. Labs wnl. Wearing compression socks.  Needs refills.               He c/o toenails being thick and hard to cut.  Using foot spa and topical compounded foot soak.       He has guille and is on bipap.  Follows with dr. Haley for this.      He is obese. Has lost weight since lov.      He had right shoulder surgery with dr. Villatoro 10/2022. Did PT.  Has follow up 9/6/23.  History of back surgery.      Never had colon cancer screening. Declines as this time.      He works as a .      He does not smoke. He is allergic to honey bees and poison ivy.       Review of Systems   Constitutional: Negative.    HENT: Negative.     Eyes: Negative.    Respiratory: Negative.     Cardiovascular: Negative.    Gastrointestinal: Negative.    Endocrine: Negative.    Genitourinary: Negative.    Musculoskeletal: Negative.    Skin: Negative.    Allergic/Immunologic: Negative.    Neurological: Negative.    Hematological: Negative.    Psychiatric/Behavioral: Negative.     All  "other systems reviewed and are negative.        Review of patient's allergies indicates:   Allergen Reactions    Poison ivy [vit e-nonoxynol 9-aloe vera]     Venom-honey bee       Vitals:    11/01/23 1459 11/01/23 1552   BP: (!) 166/82 138/80   BP Location: Left arm    Pulse: 65    Resp: 16    Temp: 98.5 °F (36.9 °C)    TempSrc: Temporal    SpO2: 98%    Weight: 95.1 kg (209 lb 11.2 oz)    Height: 5' 1" (1.549 m)       Social History     Socioeconomic History    Marital status: Unknown   Tobacco Use    Smoking status: Never    Smokeless tobacco: Never   Substance and Sexual Activity    Alcohol use: Not Currently    Drug use: Never    Sexual activity: Not Currently      Family History   Family history unknown: Yes          Objective:     Physical Exam  Vitals and nursing note reviewed.   Constitutional:       Appearance: Normal appearance. He is obese.   HENT:      Head: Normocephalic.      Nose: Nose normal.      Mouth/Throat:      Mouth: Mucous membranes are moist.      Pharynx: Oropharynx is clear.   Eyes:      Extraocular Movements: Extraocular movements intact.   Cardiovascular:      Rate and Rhythm: Normal rate and regular rhythm.   Pulmonary:      Effort: Pulmonary effort is normal.      Breath sounds: Normal breath sounds.   Musculoskeletal:         General: Normal range of motion.   Skin:     General: Skin is warm and dry.   Neurological:      General: No focal deficit present.      Mental Status: He is alert and oriented to person, place, and time. Mental status is at baseline.   Psychiatric:         Mood and Affect: Mood normal.       Current Outpatient Medications on File Prior to Visit   Medication Sig Dispense Refill    aspirin (ECOTRIN) 81 MG EC tablet Take 81 mg by mouth once daily.      cholecalciferol, vitamin D3, (D3-5000 ORAL) Take by mouth.      hyaluronate sodium, stabilized, DUROLANE, (DUROLANE) 60 mg/3 mL Inject 3 mLs (60 mg total) into the articular space As instructed (60 mg to be given in " each knee intra articular). 6 mL 0    naproxen sodium (ANAPROX) 220 MG tablet Take 220 mg by mouth 2 (two) times daily with meals.      omega-3/dha/epa/fish oil (OMEGA-3 FISH OIL ORAL) Take by mouth.      streptomycin 1 gram injection SMARTSIG:Topical      UNABLE TO FIND medication name: NUGENIX TOTAL T      UNABLE TO FIND medication name: YELLOW HORNET EXTREME ENERGIZER      UNABLE TO FIND Clindamycin/Mupirocin/Itraconazole 150/20/50mg topical capsule [37258]      UNABLE TO FIND SPRAY KIT 10ML      [DISCONTINUED] amLODIPine (NORVASC) 10 MG tablet Take 10 mg by mouth once daily.      [DISCONTINUED] furosemide (LASIX) 20 MG tablet Take 0.5 tablets (10 mg total) by mouth once daily. 15 tablet 11    [DISCONTINUED] lisinopriL (PRINIVIL,ZESTRIL) 40 MG tablet Take 40 mg by mouth once daily.       No current facility-administered medications on file prior to visit.     Health Maintenance   Topic Date Due    Hepatitis C Screening  Never done    TETANUS VACCINE  08/23/2024 (Originally 5/5/1973)    Shingles Vaccine (1 of 2) 08/23/2024 (Originally 5/5/2005)    Colorectal Cancer Screening  10/01/2024 (Originally 1955)    Lipid Panel  06/13/2028      Results for orders placed or performed in visit on 10/26/23   Comprehensive Metabolic Panel   Result Value Ref Range    Sodium Level 138 136 - 145 mmol/L    Potassium Level 4.3 3.5 - 5.1 mmol/L    Chloride 104 98 - 107 mmol/L    Carbon Dioxide 23 23 - 31 mmol/L    Glucose Level 93 82 - 115 mg/dL    Blood Urea Nitrogen 15.7 8.4 - 25.7 mg/dL    Creatinine 1.11 0.73 - 1.18 mg/dL    Calcium Level Total 10.0 8.8 - 10.0 mg/dL    Protein Total 7.4 5.8 - 7.6 gm/dL    Albumin Level 4.0 3.4 - 4.8 g/dL    Globulin 3.4 2.4 - 3.5 gm/dL    Albumin/Globulin Ratio 1.2 1.1 - 2.0 ratio    Bilirubin Total 1.3 <=1.5 mg/dL    Alkaline Phosphatase 84 40 - 150 unit/L    Alanine Aminotransferase 21 0 - 55 unit/L    Aspartate Aminotransferase 27 5 - 34 unit/L    eGFR >60 mls/min/1.73/m2   CBC with  Differential   Result Value Ref Range    WBC 7.71 4.50 - 11.50 x10(3)/mcL    RBC 5.40 4.70 - 6.10 x10(6)/mcL    Hgb 15.1 14.0 - 18.0 g/dL    Hct 45.2 42.0 - 52.0 %    MCV 83.7 80.0 - 94.0 fL    MCH 28.0 27.0 - 31.0 pg    MCHC 33.4 33.0 - 36.0 g/dL    RDW 13.7 11.5 - 17.0 %    Platelet 308 130 - 400 x10(3)/mcL    MPV 10.3 7.4 - 10.4 fL    Neut % 45.2 %    Lymph % 39.7 %    Mono % 11.5 %    Eos % 2.5 %    Basophil % 0.8 %    Lymph # 3.06 0.6 - 4.6 x10(3)/mcL    Neut # 3.49 2.1 - 9.2 x10(3)/mcL    Mono # 0.89 0.1 - 1.3 x10(3)/mcL    Eos # 0.19 0 - 0.9 x10(3)/mcL    Baso # 0.06 <=0.2 x10(3)/mcL    IG# 0.02 0 - 0.04 x10(3)/mcL    IG% 0.3 %    NRBC% 0.0 %          Assessment & Plan:     Active Problem List with Overview Notes    Diagnosis Date Noted    Bradycardia 10/10/2023    VIC treated with BiPAP 08/23/2023    Onychomycosis 08/23/2023    Class 2 severe obesity due to excess calories with serious comorbidity and body mass index (BMI) of 39.0 to 39.9 in adult 08/23/2023    Hypertension        1. Primary hypertension  Assessment & Plan:  Home bp log reviewed.     Initial bp elevated. Repeat improved to goal.     taking lisinopril 40mg, norvasc 10mg, and lasix 10mg daily due to report of LE edema. Labs 10/2023 wnl.  Wear compression socks.  Monitor bp at home. Continue on current meds. Refills sent in as requested.      Contact clinic for any concerns. Patient and daughter are agreeable to plan and verbalized understanding.     Orders:  -     furosemide (LASIX) 20 MG tablet; Take 0.5 tablets (10 mg total) by mouth once daily.  Dispense: 45 tablet; Refill: 3  -     amLODIPine (NORVASC) 10 MG tablet; Take 1 tablet (10 mg total) by mouth once daily.  Dispense: 90 tablet; Refill: 3  -     lisinopriL (PRINIVIL,ZESTRIL) 40 MG tablet; Take 1 tablet (40 mg total) by mouth once daily.  Dispense: 90 tablet; Refill: 3  -     CBC Auto Differential; Future; Expected date: 05/01/2024  -     Comprehensive Metabolic Panel; Future;  Expected date: 05/01/2024  -     Lipid Panel; Future; Expected date: 05/01/2024  -     TSH; Future; Expected date: 05/01/2024  -     Hemoglobin A1C; Future; Expected date: 05/01/2024  -     Urinalysis; Future; Expected date: 05/01/2024  -     PSA, Screening; Future; Expected date: 05/01/2024  -     Hepatitis C Antibody; Future; Expected date: 05/01/2024    2. Bradycardia  Assessment & Plan:  Improved since d/c atenolol.    Orders:  -     CBC Auto Differential; Future; Expected date: 05/01/2024  -     Comprehensive Metabolic Panel; Future; Expected date: 05/01/2024  -     Lipid Panel; Future; Expected date: 05/01/2024  -     TSH; Future; Expected date: 05/01/2024  -     Hemoglobin A1C; Future; Expected date: 05/01/2024  -     Urinalysis; Future; Expected date: 05/01/2024  -     PSA, Screening; Future; Expected date: 05/01/2024  -     Hepatitis C Antibody; Future; Expected date: 05/01/2024    3. Class 2 severe obesity due to excess calories with serious comorbidity and body mass index (BMI) of 39.0 to 39.9 in adult  Assessment & Plan:  Encouraged lifestyle change.  Has lost weight since lov.     Orders:  -     CBC Auto Differential; Future; Expected date: 05/01/2024  -     Comprehensive Metabolic Panel; Future; Expected date: 05/01/2024  -     Lipid Panel; Future; Expected date: 05/01/2024  -     TSH; Future; Expected date: 05/01/2024  -     Hemoglobin A1C; Future; Expected date: 05/01/2024  -     Urinalysis; Future; Expected date: 05/01/2024  -     PSA, Screening; Future; Expected date: 05/01/2024  -     Hepatitis C Antibody; Future; Expected date: 05/01/2024    4. Wellness examination  -     CBC Auto Differential; Future; Expected date: 05/01/2024  -     Comprehensive Metabolic Panel; Future; Expected date: 05/01/2024  -     Lipid Panel; Future; Expected date: 05/01/2024  -     TSH; Future; Expected date: 05/01/2024  -     Hemoglobin A1C; Future; Expected date: 05/01/2024  -     Urinalysis; Future; Expected date:  05/01/2024  -     PSA, Screening; Future; Expected date: 05/01/2024  -     Hepatitis C Antibody; Future; Expected date: 05/01/2024    5. Prostate cancer screening  -     CBC Auto Differential; Future; Expected date: 05/01/2024  -     Comprehensive Metabolic Panel; Future; Expected date: 05/01/2024  -     Lipid Panel; Future; Expected date: 05/01/2024  -     TSH; Future; Expected date: 05/01/2024  -     Hemoglobin A1C; Future; Expected date: 05/01/2024  -     Urinalysis; Future; Expected date: 05/01/2024  -     PSA, Screening; Future; Expected date: 05/01/2024  -     Hepatitis C Antibody; Future; Expected date: 05/01/2024         Follow up in about 7 months (around 6/1/2024) for Medicare Wellness with labs.

## 2023-11-01 NOTE — ASSESSMENT & PLAN NOTE
Home bp log reviewed.     Initial bp elevated. Repeat improved to goal.     taking lisinopril 40mg, norvasc 10mg, and lasix 10mg daily due to report of LE edema. Labs 10/2023 wnl.  Wear compression socks.  Monitor bp at home. Continue on current meds. Refills sent in as requested.      Contact clinic for any concerns. Patient and daughter are agreeable to plan and verbalized understanding.

## 2023-12-06 ENCOUNTER — OFFICE VISIT (OUTPATIENT)
Dept: ORTHOPEDICS | Facility: CLINIC | Age: 68
End: 2023-12-06
Payer: COMMERCIAL

## 2023-12-06 VITALS
HEART RATE: 67 BPM | DIASTOLIC BLOOD PRESSURE: 77 MMHG | BODY MASS INDEX: 39.46 KG/M2 | WEIGHT: 209 LBS | SYSTOLIC BLOOD PRESSURE: 160 MMHG | HEIGHT: 61 IN

## 2023-12-06 DIAGNOSIS — Z98.890 S/P ROTATOR CUFF REPAIR: Primary | ICD-10-CM

## 2023-12-06 PROCEDURE — 3078F DIAST BP <80 MM HG: CPT | Mod: CPTII,,, | Performed by: REHABILITATION UNIT

## 2023-12-06 PROCEDURE — 1159F MED LIST DOCD IN RCRD: CPT | Mod: CPTII,,, | Performed by: REHABILITATION UNIT

## 2023-12-06 PROCEDURE — 3077F PR MOST RECENT SYSTOLIC BLOOD PRESSURE >= 140 MM HG: ICD-10-PCS | Mod: CPTII,,, | Performed by: REHABILITATION UNIT

## 2023-12-06 PROCEDURE — 4010F PR ACE/ARB THEARPY RXD/TAKEN: ICD-10-PCS | Mod: CPTII,,, | Performed by: REHABILITATION UNIT

## 2023-12-06 PROCEDURE — 3008F PR BODY MASS INDEX (BMI) DOCUMENTED: ICD-10-PCS | Mod: CPTII,,, | Performed by: REHABILITATION UNIT

## 2023-12-06 PROCEDURE — 3077F SYST BP >= 140 MM HG: CPT | Mod: CPTII,,, | Performed by: REHABILITATION UNIT

## 2023-12-06 PROCEDURE — 3008F BODY MASS INDEX DOCD: CPT | Mod: CPTII,,, | Performed by: REHABILITATION UNIT

## 2023-12-06 PROCEDURE — 4010F ACE/ARB THERAPY RXD/TAKEN: CPT | Mod: CPTII,,, | Performed by: REHABILITATION UNIT

## 2023-12-06 PROCEDURE — 99213 PR OFFICE/OUTPT VISIT, EST, LEVL III, 20-29 MIN: ICD-10-PCS | Mod: ,,, | Performed by: REHABILITATION UNIT

## 2023-12-06 PROCEDURE — 3078F PR MOST RECENT DIASTOLIC BLOOD PRESSURE < 80 MM HG: ICD-10-PCS | Mod: CPTII,,, | Performed by: REHABILITATION UNIT

## 2023-12-06 PROCEDURE — 1159F PR MEDICATION LIST DOCUMENTED IN MEDICAL RECORD: ICD-10-PCS | Mod: CPTII,,, | Performed by: REHABILITATION UNIT

## 2023-12-06 PROCEDURE — 99213 OFFICE O/P EST LOW 20 MIN: CPT | Mod: ,,, | Performed by: REHABILITATION UNIT

## 2023-12-06 NOTE — LETTER
[facility]  [address] [place] [address]. 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  Although a sedentary job is defined as one which involved sitting, a certain amount of walking and standing are required only occasionally and other sedentary criteria are met.    [_] LIGHT WORK: Lifting 20 pounds with frequent lifting and/or carrying objects weighing up to 10 pounds.  Even though the weight lifted may be only a negotiable amount, a job is in the category when it involves sitting most of the time with a degree of pushing/pulling of arm and/or leg controls.    [_] MEDIUM WORK: Lifting of 50 pounds maximum with frequent lifting and/or carrying of objects up to 25 pounds.    [_] HEAVY WORK: Lifting of 100 pounds maximum with frequent lifting and/or carrying objects up to 50 pounds.    [_] VERY HEAVY WORK: Lifting objects in excess of 100 pounds with frequent lifting and/or carrying of objects weighing 50 pounds or more.    [X] REGULAR DUTY: [X] No Restrictions. [_] With Restrictions (See comments below0:    COMMENTS      Prince Villatoro MD

## 2023-12-06 NOTE — PROGRESS NOTES
Subjective:      Patient ID: Arsenio Kang is a 68 y.o. male.    Chief Complaint: Follow up R shoulder     Date of procedure: 10/21/22    Procedure:  1. Right shoulder diagnostic arthroscopy   2. Arthroscopic debridement of residual biceps tendon stump, synovium, subacromial bursa   3. Arthroscopic rotator cuff repair    Arsenio Kang returns to the clinic today for follow up examination.  He is status post the above procedure to his shoulder. He has been working full duty and doing very well with no issues. No sensorimotor changes.       Review of Systems  Comprehensive review of systems completed and negative except as per HPI.        Objective:     Vitals:    12/06/23 0908   BP: (!) 160/77   Pulse: 67       General: well-developed well-nourished in no acute distress    Physical Exam:  Right shoulder  Incisions are well healed with no erythema, fluctuance, induration, drainage or external signs of infection.    No gross swelling or epitrochlear lymphadenopathy appreciated  AROM , Abd 160, ER 80  Rotator cuff strength is intact   Firing deltoid. Full range of motion of the elbow and distally.  Sensation grossly intact to all dermatomal distributions  No neurological deficits appreciated   Palpable radial pulse        Assessment:       Encounter Diagnosis   Name Primary?    S/P rotator cuff repair Yes             Plan:       Arsenio was seen today for follow-up.    Diagnoses and all orders for this visit:    S/P rotator cuff repair    s/p above stated procedure; doing very well       Continue home exercises for prolonged and sustained shoulder health  He has return to prior level of function and is very pleased  Pain control - continue ice and meds. Risks of medications discussed  Continue full duty work and f/u PRN for shoulder  OTC meds prn   Follow up as needed for his knees when his pain returns

## 2023-12-29 ENCOUNTER — TELEPHONE (OUTPATIENT)
Dept: ORTHOPEDICS | Facility: CLINIC | Age: 68
End: 2023-12-29
Payer: COMMERCIAL

## 2023-12-29 NOTE — TELEPHONE ENCOUNTER
Pt daughter called and LVM stating patient is having russell knee pain and swelling after having injections in both knees.       Called daughter back and advised to do compression wrap, ice, elevation, and tylenol or ibproufen. Also advised since we will be closed until Tuesday can also do an urgent care to get checked out. Advised if still not better by Tuesday to call the office and will get him in to see Dr. Villatoro. Pt verbalized understanding and will call with any questions or concerns.

## 2024-01-04 ENCOUNTER — TELEPHONE (OUTPATIENT)
Dept: ORTHOPEDICS | Facility: CLINIC | Age: 69
End: 2024-01-04
Payer: COMMERCIAL

## 2024-01-04 NOTE — TELEPHONE ENCOUNTER
Patients daughter called and LVM stating patient last had injections in  both knees in may and would like to get another round of injections.         Called daughter back and was able to get patient scheduled to come in for injections. Pt verbalized understanding and will call with any questions or concerns.

## 2024-01-09 ENCOUNTER — OFFICE VISIT (OUTPATIENT)
Dept: ORTHOPEDICS | Facility: CLINIC | Age: 69
End: 2024-01-09
Payer: COMMERCIAL

## 2024-01-09 VITALS — BODY MASS INDEX: 39.65 KG/M2 | HEIGHT: 61 IN | WEIGHT: 210 LBS

## 2024-01-09 DIAGNOSIS — M17.0 PRIMARY OSTEOARTHRITIS OF BOTH KNEES: Primary | ICD-10-CM

## 2024-01-09 PROCEDURE — 1101F PT FALLS ASSESS-DOCD LE1/YR: CPT | Mod: CPTII,,, | Performed by: REHABILITATION UNIT

## 2024-01-09 PROCEDURE — 4010F ACE/ARB THERAPY RXD/TAKEN: CPT | Mod: CPTII,,, | Performed by: REHABILITATION UNIT

## 2024-01-09 PROCEDURE — 99213 OFFICE O/P EST LOW 20 MIN: CPT | Mod: ,,, | Performed by: REHABILITATION UNIT

## 2024-01-09 PROCEDURE — 3288F FALL RISK ASSESSMENT DOCD: CPT | Mod: CPTII,,, | Performed by: REHABILITATION UNIT

## 2024-01-09 PROCEDURE — 1159F MED LIST DOCD IN RCRD: CPT | Mod: CPTII,,, | Performed by: REHABILITATION UNIT

## 2024-01-09 PROCEDURE — 3008F BODY MASS INDEX DOCD: CPT | Mod: CPTII,,, | Performed by: REHABILITATION UNIT

## 2024-01-09 NOTE — LETTER
January 9, 2024       [facility]  [address], [address]  [facility] [address]  Phone: [phone]  Fax: [phone]       Patient: Arsenio Kang   YOB: 1955  Date of Visit: 01/09/2024    To Whom It May Concern:    Jose Kang  was at [facility] on 01/09/2024. Please excuse the patient from work from 12/27/23 to 01/10/2024. The patient may return to work with no restrictions. If you have any questions or concerns, or if I can be of further assistance, please do not hesitate to contact me.    Sincerely,    Prince Villatoro M.D.

## 2024-01-09 NOTE — PROGRESS NOTES
Subjective:      Patient ID: Arsenio Kang is a 68 y.o. male.    Chief Complaint: Pain of the Right Knee (C/o bilateral knee pain, mainly pain in the left knee, he feels like its nerve pain. Patient has been out of work x1 week. Tried tiger balm and seems to have helped. But patient need excuse for work. ) and Pain of the Left Knee    Date of procedure: 10/21/22    Procedure:  1. Right shoulder diagnostic arthroscopy   2. Arthroscopic debridement of residual biceps tendon stump, synovium, subacromial bursa   3. Arthroscopic rotator cuff repair    Arsenio Kang returns to the clinic today for follow up examination.  He had bilateral Visco supplementation about 7 months ago.  He is a  and off loads heavy material.  He states about a week ago he noticed what felt like nerve pain that was shooting from his left hip down the lateral aspect to his left knee.  He states since then this has resolved and he denies any nerve symptomatology today.  He also denies any hip or knee pain.  He does report that he has had lumbar spine surgery about 40 years ago.  He presents today to get a release to return to work.  He is still not functionally able to return to his physically demanding job.      Review of Systems  Comprehensive review of systems completed and negative except as per HPI.        Objective:     There were no vitals filed for this visit.    General: well-developed well-nourished in no acute distress    bilateral KNEE:     Alignment: varus  Appearance: skin in intact without lesion  Effusion: none  Gait: antalgic  Straight Leg Raise: negative  Log Roll: negative  Hip ROM: full and painless  Ankle ROM: full and painless   Knee ROM:  2 - 115 with pain to the left knee with flexion beyond 120 degrees  Tenderness: diffuse TTP    Patellar Mobility: decreased  Patellar grind: +  PF Crepitus:+       Valgus Stress @ 0 deg: stable  Valgus Stress @ 30 deg: stable  Varus Stress @ 0 deg: stable  Varus Stress @ 30  deg: stable  Lachman: stable  Ant Drawer: negative   Post Drawer: negative  Posterior Sag Sign: negative  Neurological deficits: SILT dp/sp/t distributions  Motor: 5/5 EHL/FHL/TA/GS  Bilateral pitting edema to the knee    Warm well perfused lower extremity with capillary refill less than 2 seconds and sensation intact to light touch in the terminal nerve distributions. Calf soft and easily compressible without clinical sign of DVT. No palpable popliteal lymphadenopathy    Imaging:  Four view radiographs of the bilateral knees previously obtained show no acute fractures or dislocations.  Advanced tricompartmental osteoarthritic changes with loss of joint space and bone-on-bone arthritis most severe in the medial compartment.  There are surrounding osteophytes with subchondral sclerosis.  Varus alignment.  Assessment:       Encounter Diagnosis   Name Primary?    Primary osteoarthritis of both knees Yes               Plan:       Arsenio was seen today for pain and pain.    Diagnoses and all orders for this visit:    Primary osteoarthritis of both knees        Continue OTC anti inflammatories for knee pain as needed.  His knees feel great today and he has had no pain or mechanical symptomatolgy since his last visco injections 7 months ago.  He would benefit from a home exercise program to work on stretching, strengthening, and motion exercises of bilateral lower extremities.   Weight loss again encouraged for his knees.  We did discuss a BMI target of less than 40 when he is ready for total knee arthroplasty.  I think he is capable of returning to work at this time.  This was discussed with him.    Prince Villatoro MD personally performed the services described in this documentation, including but not limited to patient's history, physical examination, and assessment and plan of care. All medical record entries made by SANDIE Estrada were performed at his direction and in his presence. The medical record was reviewed  and is accurate and complete.     Follow-up: Arsenio Kang to follow up as needed. If there are any questions prior to this, the patient was instructed to contact the office.

## 2024-02-18 ENCOUNTER — OFFICE VISIT (OUTPATIENT)
Dept: URGENT CARE | Facility: CLINIC | Age: 69
End: 2024-02-18
Payer: COMMERCIAL

## 2024-02-18 VITALS
BODY MASS INDEX: 39.65 KG/M2 | DIASTOLIC BLOOD PRESSURE: 71 MMHG | RESPIRATION RATE: 18 BRPM | HEART RATE: 60 BPM | SYSTOLIC BLOOD PRESSURE: 134 MMHG | WEIGHT: 210 LBS | HEIGHT: 61 IN | TEMPERATURE: 99 F | OXYGEN SATURATION: 98 %

## 2024-02-18 DIAGNOSIS — U07.1 COVID-19: ICD-10-CM

## 2024-02-18 DIAGNOSIS — U07.1 COVID-19 VIRUS DETECTED: ICD-10-CM

## 2024-02-18 DIAGNOSIS — R05.1 ACUTE COUGH: ICD-10-CM

## 2024-02-18 DIAGNOSIS — R05.9 COUGH, UNSPECIFIED TYPE: Primary | ICD-10-CM

## 2024-02-18 LAB
CTP QC/QA: YES
CTP QC/QA: YES
POC MOLECULAR INFLUENZA A AGN: NEGATIVE
POC MOLECULAR INFLUENZA B AGN: NEGATIVE
SARS-COV-2 RDRP RESP QL NAA+PROBE: POSITIVE

## 2024-02-18 PROCEDURE — 87502 INFLUENZA DNA AMP PROBE: CPT | Mod: QW,,, | Performed by: NURSE PRACTITIONER

## 2024-02-18 PROCEDURE — 87635 SARS-COV-2 COVID-19 AMP PRB: CPT | Mod: QW,,, | Performed by: NURSE PRACTITIONER

## 2024-02-18 PROCEDURE — 99214 OFFICE O/P EST MOD 30 MIN: CPT | Mod: ,,, | Performed by: NURSE PRACTITIONER

## 2024-02-18 RX ORDER — PROMETHAZINE HYDROCHLORIDE AND DEXTROMETHORPHAN HYDROBROMIDE 6.25; 15 MG/5ML; MG/5ML
5 SYRUP ORAL EVERY 4 HOURS PRN
Qty: 180 ML | Refills: 0 | Status: SHIPPED | OUTPATIENT
Start: 2024-02-18 | End: 2024-02-28

## 2024-02-18 NOTE — PATIENT INSTRUCTIONS
Take Mucinex as directed for cough.  Or prescription promethazine DM preferably at night  Take prescription Paxlovid for COVID-19    Increase oral fluids, take daily vitamins as directed, Self quarantine for 5 days.    Go to ER for worsening symptoms including shortness of breath, fever, or worsening symptoms.     POSITIVE COVID TEST    You have tested positive for COVID-19 today.  Please note that patients who test positive for COVID-19 are required by the CDC to undergo isolation for 5 days     However, if you are asymptomatic (a person who does not have any symptoms) and COVID-19 positive, your 5-day isolation begins on the day you tested positive, regardless of exposure date.    Also, per the CDC guidelines, once your 5 days have passed, and you have not had fever greater than 100.4F in the last 24 hours without taking any fever reducers such as Tylenol (Acetaminophen) or Motrin (Ibuprofen), you may return to your normal activities including social distancing, wearing masks, and frequent handwashing - YOU DO NOT NEED ANOTHER TEST IN ORDER TO END YOUR QUARANTINE.

## 2024-02-18 NOTE — PROGRESS NOTES
"Subjective:      Patient ID: Arsenio Kang is a 68 y.o. male.    Vitals:  height is 5' 1" (1.549 m) and weight is 95.3 kg (210 lb). His temperature is 98.7 °F (37.1 °C). His blood pressure is 134/71 and his pulse is 60. His respiration is 18 and oxygen saturation is 98%.     Chief Complaint: Cough ( Patient is a 68 y.o. male who presents to urgent care with complaints of vomiting, runny nose, body aches and coughing x1 days.)    68-year-old male presents with nasal congestion, cough, and vomiting.  Onset yesterday.  No shortness of breath or fever    Cough      HENT:  Positive for congestion.    Respiratory:  Positive for cough.    Gastrointestinal:  Positive for nausea and vomiting. Negative for abdominal pain and diarrhea.      Objective:     Physical Exam   Constitutional: He is oriented to person, place, and time. He appears well-developed. He is cooperative.  Non-toxic appearance. He does not appear ill. No distress.   HENT:   Head: Normocephalic and atraumatic.   Ears:   Right Ear: Hearing, tympanic membrane, external ear and ear canal normal.   Left Ear: Hearing, tympanic membrane, external ear and ear canal normal.   Nose: Nose normal. No mucosal edema, rhinorrhea or nasal deformity. No epistaxis. Right sinus exhibits no maxillary sinus tenderness and no frontal sinus tenderness. Left sinus exhibits no maxillary sinus tenderness and no frontal sinus tenderness.   Mouth/Throat: Uvula is midline, oropharynx is clear and moist and mucous membranes are normal. No trismus in the jaw. Normal dentition. No uvula swelling. No oropharyngeal exudate, posterior oropharyngeal edema or posterior oropharyngeal erythema.   Eyes: Conjunctivae and lids are normal. No scleral icterus.   Neck: Trachea normal and phonation normal. Neck supple. No edema present. No erythema present. No neck rigidity present.   Cardiovascular: Normal rate, regular rhythm, normal heart sounds and normal pulses.   Pulmonary/Chest: Effort normal " and breath sounds normal. No respiratory distress. He has no decreased breath sounds. He has no rhonchi.   Abdominal: Normal appearance.   Musculoskeletal: Normal range of motion.         General: No deformity. Normal range of motion.   Neurological: He is alert and oriented to person, place, and time. He exhibits normal muscle tone. Coordination normal.   Skin: Skin is warm, dry, intact, not diaphoretic and not pale.   Psychiatric: His speech is normal and behavior is normal. Judgment and thought content normal.   Nursing note and vitals reviewed.      Assessment:     1. Cough, unspecified type    2. Acute cough    3. COVID-19      Office Visit on 02/18/2024   Component Date Value Ref Range Status    POC Rapid COVID 02/18/2024 Positive (A)  Negative Final     Acceptable 02/18/2024 Yes   Final    POC Molecular Influenza A Ag 02/18/2024 Negative  Negative, Not Reported Final    POC Molecular Influenza B Ag 02/18/2024 Negative  Negative, Not Reported Final     Acceptable 02/18/2024 Yes   Final       Plan:   Take Mucinex as directed for cough.  Or prescription promethazine DM preferably at night  Take prescription Paxlovid for COVID-19    Increase oral fluids, take daily vitamins as directed, Self quarantine for 5 days.    Go to ER for worsening symptoms including shortness of breath, fever, or worsening symptoms.       Cough, unspecified type  -     POCT COVID-19 Rapid Screening  -     POCT Influenza A/B Molecular    Acute cough  -     XR CHEST PA AND LATERAL; Future; Expected date: 02/18/2024  -     promethazine-dextromethorphan (PROMETHAZINE-DM) 6.25-15 mg/5 mL Syrp; Take 5 mLs by mouth every 4 (four) hours as needed (cough).  Dispense: 180 mL; Refill: 0    COVID-19  -     nirmatrelvir-ritonavir 300 mg (150 mg x 2)-100 mg copackaged tablets (EUA); Take 3 tablets by mouth 2 (two) times daily for 5 days. Each dose contains 2 nirmatrelvir (pink tablets) and 1 ritonavir (white tablet). Take  all 3 tablets together  Dispense: 30 tablet; Refill: 0

## 2024-02-18 NOTE — LETTER
February 19, 2024      [facility]  [address], [address]  [place] [address]  Phone: [phone]  Fax: [phone]       Patient: Arsenio Kang   YOB: 1955  Date of Visit: 02/19/2024    To Whom It May Concern:    Jose Kang  was at [facility] on 02/19/2024. The patient may return to work/school on 02/24/2024 with no restrictions. If you have any questions or concerns, or if I can be of further assistance, please do not hesitate to contact me.    Sincerely,    Nina Santos LPN

## 2024-02-19 ENCOUNTER — PATIENT MESSAGE (OUTPATIENT)
Dept: RESEARCH | Facility: HOSPITAL | Age: 69
End: 2024-02-19
Payer: COMMERCIAL

## 2024-03-28 ENCOUNTER — OFFICE VISIT (OUTPATIENT)
Dept: URGENT CARE | Facility: CLINIC | Age: 69
End: 2024-03-28
Payer: COMMERCIAL

## 2024-03-28 VITALS
OXYGEN SATURATION: 97 % | HEIGHT: 61 IN | HEART RATE: 79 BPM | RESPIRATION RATE: 18 BRPM | BODY MASS INDEX: 38.89 KG/M2 | SYSTOLIC BLOOD PRESSURE: 159 MMHG | WEIGHT: 206 LBS | DIASTOLIC BLOOD PRESSURE: 80 MMHG | TEMPERATURE: 98 F

## 2024-03-28 DIAGNOSIS — R05.9 COUGH, UNSPECIFIED TYPE: Primary | ICD-10-CM

## 2024-03-28 DIAGNOSIS — J32.9 SINUSITIS, UNSPECIFIED CHRONICITY, UNSPECIFIED LOCATION: ICD-10-CM

## 2024-03-28 PROCEDURE — 99213 OFFICE O/P EST LOW 20 MIN: CPT | Mod: 25,,,

## 2024-03-28 PROCEDURE — 96372 THER/PROPH/DIAG INJ SC/IM: CPT | Mod: ,,,

## 2024-03-28 RX ORDER — BENZONATATE 100 MG/1
100 CAPSULE ORAL 3 TIMES DAILY PRN
Qty: 15 CAPSULE | Refills: 0 | Status: SHIPPED | OUTPATIENT
Start: 2024-03-28 | End: 2024-04-02

## 2024-03-28 RX ORDER — DEXAMETHASONE SODIUM PHOSPHATE 100 MG/10ML
6 INJECTION INTRAMUSCULAR; INTRAVENOUS
Status: COMPLETED | OUTPATIENT
Start: 2024-03-28 | End: 2024-03-28

## 2024-03-28 RX ORDER — DOXYCYCLINE 100 MG/1
100 CAPSULE ORAL EVERY 12 HOURS
Qty: 14 CAPSULE | Refills: 0 | Status: SHIPPED | OUTPATIENT
Start: 2024-03-28 | End: 2024-04-04

## 2024-03-28 RX ADMIN — DEXAMETHASONE SODIUM PHOSPHATE 6 MG: 100 INJECTION INTRAMUSCULAR; INTRAVENOUS at 05:03

## 2024-03-28 NOTE — PATIENT INSTRUCTIONS
Chest xray negative     As discussed, this is most likely a viral infection, if symptoms persist or worsen over the next few days start the antibiotic   Use the tessalon pearls as needed for daytime cough   Start taking an allergy pill daily such as claritin, zyrtec, allegrea or xyzal. Also start using a nasal steroid spray such as flonase or nasacort daily. Coricidin HBP as needed for severe nasal congestion. Monitor for fever. Take tylenol/acetaminophen or ibuprofen as needed. Rest and hydrate. If symptoms persist or worsen, return to clinic or seek medical attention immediately.

## 2024-03-28 NOTE — PROGRESS NOTES
"Subjective:      Patient ID: Arsenio Kang is a 68 y.o. male.    Vitals:  height is 5' 1" (1.549 m) and weight is 93.4 kg (206 lb). His oral temperature is 98.2 °F (36.8 °C). His blood pressure is 159/80 (abnormal) and his pulse is 79. His respiration is 18 and oxygen saturation is 97%.     Chief Complaint: Cough (Cough, chest soreness, x 4 days, took Rx cough med from past visit)    A 67 y/o male presents to the clinic with c/o cough, chest soreness with cough, nasal congestion, fatigue, and post nasal drip x 4 days. He reports mild improvement with rx cough medication. He denies any fever, body aches, chills, wheezing, sob, cp, n/v/d, abdominal complaints, rash, difficulty swallowing, neck stiffness, or changes in intake or output.       Cough  Associated symptoms include postnasal drip. Pertinent negatives include no chills, fever, sore throat, shortness of breath or wheezing.       Constitution: Positive for fatigue. Negative for chills and fever.   HENT:  Positive for congestion and postnasal drip. Negative for sore throat, trouble swallowing and voice change.    Respiratory:  Positive for cough. Negative for sputum production, shortness of breath, wheezing and asthma.    Allergic/Immunologic: Negative for asthma.      Objective:     Physical Exam   Constitutional: He is oriented to person, place, and time. He appears well-developed. He is cooperative.  Non-toxic appearance. He does not appear ill. No distress.   HENT:   Head: Normocephalic and atraumatic.   Ears:   Right Ear: Hearing and external ear normal. impacted cerumen  Left Ear: Hearing and external ear normal. impacted cerumen  Nose: Congestion present.   Mouth/Throat: Mucous membranes are normal. Mucous membranes are moist. Posterior oropharyngeal erythema (clear pnd) present. Oropharynx is clear.   Eyes: Conjunctivae and lids are normal.   Neck: Trachea normal and phonation normal. Neck supple. No edema present. No erythema present. No neck " rigidity present.   Cardiovascular: Normal rate, regular rhythm and normal heart sounds.   Pulmonary/Chest: Effort normal. No stridor. No respiratory distress. He has no decreased breath sounds. He has no wheezes. He has rhonchi (left lower lobe posteriorly able to clear with cough). He has no rales.   Abdominal: Normal appearance.   Neurological: He is alert and oriented to person, place, and time. He exhibits normal muscle tone.   Skin: Skin is warm, intact, not diaphoretic and no rash. Capillary refill takes less than 2 seconds.   Psychiatric: His speech is normal and behavior is normal. Mood normal.   Nursing note and vitals reviewed.      Assessment:     1. Cough, unspecified type    2. Sinusitis, unspecified chronicity, unspecified location        Plan:       Cough, unspecified type  -     X-Ray Chest PA And Lateral    Sinusitis, unspecified chronicity, unspecified location    Other orders  -     dexAMETHasone injection 6 mg  -     doxycycline (MONODOX) 100 MG capsule; Take 1 capsule (100 mg total) by mouth every 12 (twelve) hours. for 7 days  Dispense: 14 capsule; Refill: 0  -     benzonatate (TESSALON) 100 MG capsule; Take 1 capsule (100 mg total) by mouth 3 (three) times daily as needed for Cough.  Dispense: 15 capsule; Refill: 0    Chest xray negative     As discussed, this is most likely a viral infection, if symptoms persist or worsen over the next few days start the antibiotic   Use the tessalon pearls as needed for daytime cough   Start taking an allergy pill daily such as claritin, zyrtec, allegrea or xyzal. Also start using a nasal steroid spray such as flonase or nasacort daily. Coricidin HBP as needed for severe nasal congestion. Monitor for fever. Take tylenol/acetaminophen or ibuprofen as needed. Rest and hydrate. If symptoms persist or worsen, return to clinic or seek medical attention immediately.

## 2024-06-05 ENCOUNTER — TELEPHONE (OUTPATIENT)
Dept: FAMILY MEDICINE | Facility: CLINIC | Age: 69
End: 2024-06-05
Payer: COMMERCIAL

## 2024-06-12 ENCOUNTER — OFFICE VISIT (OUTPATIENT)
Dept: FAMILY MEDICINE | Facility: CLINIC | Age: 69
End: 2024-06-12
Payer: COMMERCIAL

## 2024-06-12 VITALS
HEART RATE: 95 BPM | RESPIRATION RATE: 16 BRPM | WEIGHT: 201 LBS | HEIGHT: 61 IN | SYSTOLIC BLOOD PRESSURE: 138 MMHG | TEMPERATURE: 99 F | OXYGEN SATURATION: 97 % | BODY MASS INDEX: 37.95 KG/M2 | DIASTOLIC BLOOD PRESSURE: 70 MMHG

## 2024-06-12 DIAGNOSIS — G47.33 OSA TREATED WITH BIPAP: ICD-10-CM

## 2024-06-12 DIAGNOSIS — J40 BRONCHITIS: ICD-10-CM

## 2024-06-12 DIAGNOSIS — Z53.20 COLON CANCER SCREENING DECLINED: ICD-10-CM

## 2024-06-12 DIAGNOSIS — E66.01 CLASS 2 SEVERE OBESITY DUE TO EXCESS CALORIES WITH SERIOUS COMORBIDITY AND BODY MASS INDEX (BMI) OF 37.0 TO 37.9 IN ADULT: ICD-10-CM

## 2024-06-12 DIAGNOSIS — Z00.00 WELLNESS EXAMINATION: Primary | ICD-10-CM

## 2024-06-12 DIAGNOSIS — I10 PRIMARY HYPERTENSION: ICD-10-CM

## 2024-06-12 DIAGNOSIS — M17.0 PRIMARY OSTEOARTHRITIS OF BOTH KNEES: ICD-10-CM

## 2024-06-12 PROBLEM — Z71.89 ADVANCED CARE PLANNING/COUNSELING DISCUSSION: Status: RESOLVED | Noted: 2024-06-12 | Resolved: 2024-06-12

## 2024-06-12 PROBLEM — Z71.89 ADVANCED CARE PLANNING/COUNSELING DISCUSSION: Status: ACTIVE | Noted: 2024-06-12

## 2024-06-12 PROBLEM — U07.1 COVID-19: Status: RESOLVED | Noted: 2024-02-18 | Resolved: 2024-06-12

## 2024-06-12 PROCEDURE — 1126F AMNT PAIN NOTED NONE PRSNT: CPT | Mod: CPTII,,, | Performed by: FAMILY MEDICINE

## 2024-06-12 PROCEDURE — 1159F MED LIST DOCD IN RCRD: CPT | Mod: CPTII,,, | Performed by: FAMILY MEDICINE

## 2024-06-12 PROCEDURE — 96372 THER/PROPH/DIAG INJ SC/IM: CPT | Mod: ,,, | Performed by: FAMILY MEDICINE

## 2024-06-12 PROCEDURE — 99397 PER PM REEVAL EST PAT 65+ YR: CPT | Mod: 25,,, | Performed by: FAMILY MEDICINE

## 2024-06-12 PROCEDURE — 3288F FALL RISK ASSESSMENT DOCD: CPT | Mod: CPTII,,, | Performed by: FAMILY MEDICINE

## 2024-06-12 PROCEDURE — 3078F DIAST BP <80 MM HG: CPT | Mod: CPTII,,, | Performed by: FAMILY MEDICINE

## 2024-06-12 PROCEDURE — 4010F ACE/ARB THERAPY RXD/TAKEN: CPT | Mod: CPTII,,, | Performed by: FAMILY MEDICINE

## 2024-06-12 PROCEDURE — 3008F BODY MASS INDEX DOCD: CPT | Mod: CPTII,,, | Performed by: FAMILY MEDICINE

## 2024-06-12 PROCEDURE — 1101F PT FALLS ASSESS-DOCD LE1/YR: CPT | Mod: CPTII,,, | Performed by: FAMILY MEDICINE

## 2024-06-12 PROCEDURE — 99213 OFFICE O/P EST LOW 20 MIN: CPT | Mod: 25,,, | Performed by: FAMILY MEDICINE

## 2024-06-12 PROCEDURE — 3075F SYST BP GE 130 - 139MM HG: CPT | Mod: CPTII,,, | Performed by: FAMILY MEDICINE

## 2024-06-12 RX ORDER — DEXAMETHASONE SODIUM PHOSPHATE 4 MG/ML
4 INJECTION, SOLUTION INTRA-ARTICULAR; INTRALESIONAL; INTRAMUSCULAR; INTRAVENOUS; SOFT TISSUE
Status: DISCONTINUED | OUTPATIENT
Start: 2024-06-12 | End: 2024-06-12

## 2024-06-12 RX ORDER — LISINOPRIL 40 MG/1
40 TABLET ORAL DAILY
Qty: 90 TABLET | Refills: 3 | Status: SHIPPED | OUTPATIENT
Start: 2024-06-12 | End: 2025-06-12

## 2024-06-12 RX ORDER — DOXYCYCLINE HYCLATE 100 MG
100 TABLET ORAL 2 TIMES DAILY
Qty: 14 TABLET | Refills: 0 | Status: SHIPPED | OUTPATIENT
Start: 2024-06-12 | End: 2024-06-19

## 2024-06-12 RX ORDER — DEXAMETHASONE SODIUM PHOSPHATE 4 MG/ML
4 INJECTION, SOLUTION INTRA-ARTICULAR; INTRALESIONAL; INTRAMUSCULAR; INTRAVENOUS; SOFT TISSUE
Status: COMPLETED | OUTPATIENT
Start: 2024-06-12 | End: 2024-06-12

## 2024-06-12 RX ORDER — AMLODIPINE BESYLATE 10 MG/1
10 TABLET ORAL DAILY
Qty: 90 TABLET | Refills: 3 | Status: SHIPPED | OUTPATIENT
Start: 2024-06-12 | End: 2025-06-12

## 2024-06-12 RX ORDER — FUROSEMIDE 20 MG/1
10 TABLET ORAL DAILY
Qty: 45 TABLET | Refills: 3 | Status: SHIPPED | OUTPATIENT
Start: 2024-06-12 | End: 2025-06-12

## 2024-06-12 RX ADMIN — DEXAMETHASONE SODIUM PHOSPHATE 4 MG: 4 INJECTION, SOLUTION INTRA-ARTICULAR; INTRALESIONAL; INTRAMUSCULAR; INTRAVENOUS; SOFT TISSUE at 03:06

## 2024-06-12 NOTE — ASSESSMENT & PLAN NOTE
Reports symptoms intermittently x 1 month.  Negative cxr 3/2024 at [facility].  Asking for steroid shot.  Has had >3 months.  Tolerated well.  Will send in antibiotics due to duration.  Encouraged fluids. Rest.  Monitor symptoms. Contact clinic for concerns. Patient is agreeable to plan and verbalized understanding

## 2024-06-12 NOTE — ASSESSMENT & PLAN NOTE
Well controlled on current prescriptions. taking lisinopril 40mg, norvasc 10mg, and lasix 10mg daily due to report of LE edema. Labs 10/2023 wnl.  Wear compression socks.  Monitor bp at home. Continue on current meds. Refills sent in.      Contact clinic for any concerns. Patient is agreeable to plan and verbalized understanding.

## 2024-06-12 NOTE — ASSESSMENT & PLAN NOTE
"Fasting labs ordered. Will call with results when available  No results found for: "PSA", "PSATOTAL", "PSAFREE", "PSAFREEPCT"  Declines colon cancer screening  Declines immunizations  "

## 2024-06-12 NOTE — PROGRESS NOTES
Patient ID: [id number]     Chief Complaint: No chief complaint on file.      HPI:     Arsenio Kang is a 69 y.o. male here today for a Medicare Wellness. No other complaints today.     Patient presents to clinic unaccompanied for for bp follow up.  He did labs prior to his appt and it was reviewed with patient at time of appt today.       He has htn.  Is back working again.  Currently taking lisinopril 40mg, norvasc 10mg, and atenolol 25mg daily and asa. Having low heart rate (in 40s) and was feeling dehydrated and dizzy. Previously on chorthalidone (was taking am). Was taking atenolol 50mg but decreased dose to 25mg daily and hr has been better.   History of murmur. Does not see cards.  At 10/10/23 appt, Has bp log with him. Has some 140s-150s systolic but overall wnl.  HR upper 40s-50/60s.  Daughter reports some LE edema.  Does not wear compression socks.  We stopped atenolol and started lasix 10mg daily.  He is here today for follow up. Has bp log.  Varies.  Feeling well. Labs wnl. Wearing compression socks.  Needs refills.               He c/o toenails being thick and hard to cut.  Using foot spa and topical compounded foot soak.       He has guille and is on bipap.  Follows with dr. Haley for this.      He is obese. Has lost weight since lov.      He had right shoulder surgery with dr. Villatoro 10/2022. Did PT.  Has follow up 9/6/23.  History of back surgery.      Never had colon cancer screening. Declines as this time.      He works as a .      He does not smoke. He is allergic to honey bees and poison ivy.        Past Surgical History:   Procedure Laterality Date    APPENDECTOMY      ARTHROSCOPIC REPAIR OF ROTATOR CUFF OF SHOULDER Right 10/21/2022    Procedure: REPAIR, ROTATOR CUFF, ARTHROSCOPIC;  Surgeon: Prince Villatoro MD;  Location: [facility];  Service: Orthopedics;  Laterality: Right;    BACK SURGERY         Review of patient's allergies indicates:   Allergen Reactions    Poison ivy [vit  e-nonoxynol 9-aloe vera]     Venom-honey bee        No outpatient medications have been marked as taking for the 6/12/24 encounter (Appointment) with Aniya Irwin MD.       Social History     Socioeconomic History    Marital status: Unknown   Tobacco Use    Smoking status: Never    Smokeless tobacco: Never   Substance and Sexual Activity    Alcohol use: Yes     Comment: socially    Drug use: Never    Sexual activity: Not Currently        Family History   Problem Relation Name Age of Onset    Lung cancer Mother      Heart attack Father      Hypertension Father      No Known Problems Sister      Coronary artery disease Brother          Patient Care Team:  Aniya Irwin MD as PCP - General (Family Medicine)  Prince Villatoro MD as Consulting Physician (Orthopedic Surgery)  Avi Haley Sr., MD as Consulting Physician (Internal Medicine)       Subjective:     ROS      Patient Reported Health Risk Assessment       Objective:     There were no vitals taken for this visit.    Physical Exam           No data to display                  1/9/2024    10:30 AM 11/1/2023     2:45 PM 10/10/2023     3:00 PM 8/23/2023     2:30 PM 6/5/2023     9:00 AM 5/22/2023     1:00 PM 4/3/2023     9:00 AM   Fall Risk Assessment - Outpatient   Mobility Status Ambulatory Ambulatory Ambulatory Ambulatory Ambulatory Ambulatory Ambulatory   Number of falls 0 0 0 0 1 0 0   Identified as fall risk False False False False False False False              Assessment/Plan:       Medicare Annual Wellness and Personalized Prevention Plan:   Fall Risk + Home Safety + Hearing Impairment + Depression Screen + Cognitive Impairment Screen + Health Risk Assessment all reviewed.     Opioid Screening: Patient medication list reviewed, patient {IS / IS NOT:63338} taking prescription opioids. Patient {IS / IS NOT:55801} using additional opioids than prescribed. Patient {IS / IS NOT:96813} at low risk of substance abuse based on this opioid use  history.         Health Maintenance Topics with due status: Not Due       Topic Last Completion Date    Lipid Panel 06/13/2023    Influenza Vaccine Not Due      The patient's Health Maintenance was reviewed and the following appears to be due at this time:   Health Maintenance Due   Topic Date Due    Hepatitis C Screening  Never done    Hemoglobin A1c (Diabetic Prevention Screening)  Never done    RSV Vaccine (Age 60+ and Pregnant patients) (1 - 1-dose 60+ series) Never done    COVID-19 Vaccine (3 - 2023-24 season) 09/01/2023         {There are no diagnoses linked to this encounter. (Refresh or delete this SmartLink)}     Advance Care Planning   I attest to discussing Advance Care Planning with patient and/or family member.  Education was provided including the importance of the Health Care Power of , Advance Directives, and/or [place] documentation.  The patient expressed understanding to the importance of this information and discussion.  Length of ACP conversation in minutes:          Medication List with Changes/Refills   Current Medications    AMLODIPINE (NORVASC) 10 MG TABLET    Take 1 tablet (10 mg total) by mouth once daily.       Start Date: 11/1/2023 End Date: 10/31/2024    ASPIRIN (ECOTRIN) 81 MG EC TABLET    Take 81 mg by mouth once daily.       Start Date: --        End Date: --    CHOLECALCIFEROL, VITAMIN D3, (D3-5000 ORAL)    Take by mouth.       Start Date: --        End Date: --    FUROSEMIDE (LASIX) 20 MG TABLET    Take 0.5 tablets (10 mg total) by mouth once daily.       Start Date: 11/1/2023 End Date: 10/31/2024    HYALURONATE SODIUM, STABILIZED, DUROLANE, (DUROLANE) 60 MG/3 ML    Inject 3 mLs (60 mg total) into the articular space As instructed (60 mg to be given in each knee intra articular).       Start Date: 5/2/2023  End Date: --    LISINOPRIL (PRINIVIL,ZESTRIL) 40 MG TABLET    Take 1 tablet (40 mg total) by mouth once daily.       Start Date: 11/1/2023 End Date: 10/31/2024     NAPROXEN SODIUM (ANAPROX) 220 MG TABLET    Take 220 mg by mouth 2 (two) times daily with meals.       Start Date: --        End Date: --    OMEGA-3/DHA/EPA/FISH OIL (OMEGA-3 FISH OIL ORAL)    Take by mouth.       Start Date: --        End Date: --    STREPTOMYCIN 1 GRAM INJECTION    SMARTSIG:Topical       Start Date: 9/1/2023  End Date: --    UNABLE TO FIND    medication name: NUGENIX TOTAL T       Start Date: --        End Date: --    UNABLE TO FIND    medication name: YELLOW HORNET EXTREME ENERGIZER       Start Date: --        End Date: --    UNABLE TO FIND    Clindamycin/Mupirocin/Itraconazole 150/20/50mg topical capsule [24526]       Start Date: 9/1/2023  End Date: --    UNABLE TO FIND    SPRAY KIT 10ML       Start Date: 9/1/2023  End Date: --        No follow-ups on file. In addition to their scheduled follow up, the patient has also been instructed to follow up on as needed basis.

## 2024-06-12 NOTE — PROGRESS NOTES
"Subjective:        Patient ID: Arsenio Kang is a 69 y.o. male.    Chief Complaint: Annual Exam (Wellness /Patient reports cough )      Patient presents to clinic unaccompanied for his wellness visit.  He is due for labs      He c/o cough x 1 month intermittently.  Daughter is sick with similar symptoms. Sometimes productive.  Took cough syrup otc and caused him to sleep x almost 24 hours. No fever. No sore throat. No ear pain. Had covid 2/2024. Went to [facility] 3/2024 for cough. Cxr negative. Feels like this is a new symptom. Not a continuation of previous illness     He has htn.  Currently taking lisinopril 40mg, norvasc 10mg, and lasix 10mg daily.  Feeling well.Wearing compression socks.  Needs refills.       He has guille and is on bipap.  Follows with dr. Haley for this.      He is obese. Has lost weight since lov.      He had right shoulder surgery with dr. Villatoro 10/2022. Did PT.  History of back surgery.  Has also seen him for bilateral knee OA and done injections with him.      Never had colon cancer screening. Declines as this time.      He works as a .      He does not smoke. He is allergic to honey bees and poison ivy.       Review of Systems   Constitutional: Negative.    HENT: Negative.     Eyes: Negative.    Respiratory:  Positive for cough.    Cardiovascular: Negative.    Gastrointestinal: Negative.    Endocrine: Negative.    Genitourinary: Negative.    Musculoskeletal: Negative.    Skin: Negative.    Allergic/Immunologic: Negative.    Neurological: Negative.    Hematological: Negative.    Psychiatric/Behavioral: Negative.     All other systems reviewed and are negative.        Review of patient's allergies indicates:   Allergen Reactions    Poison ivy [vit e-nonoxynol 9-aloe vera]     Venom-honey bee       Vitals:    06/12/24 1459   BP: 138/70   BP Location: Left arm   Pulse: 95   Resp: 16   Temp: 98.9 °F (37.2 °C)   TempSrc: Temporal   SpO2: 97%   Weight: 91.2 kg (201 lb)   Height: 5' 1" " (1.549 m)      Social History     Socioeconomic History    Marital status: Unknown   Tobacco Use    Smoking status: Never    Smokeless tobacco: Never   Substance and Sexual Activity    Alcohol use: Yes     Comment: socially    Drug use: Never    Sexual activity: Not Currently      Family History   Problem Relation Name Age of Onset    Lung cancer Mother      Heart attack Father      Hypertension Father      No Known Problems Sister      Coronary artery disease Brother            Objective:     Physical Exam  Vitals and nursing note reviewed.   Constitutional:       Appearance: Normal appearance. He is obese.   HENT:      Head: Normocephalic.      Nose: Nose normal.      Mouth/Throat:      Mouth: Mucous membranes are moist.      Pharynx: Oropharynx is clear.   Eyes:      Extraocular Movements: Extraocular movements intact.   Cardiovascular:      Rate and Rhythm: Normal rate and regular rhythm.   Pulmonary:      Effort: Pulmonary effort is normal.      Breath sounds: Normal breath sounds.   Musculoskeletal:         General: Normal range of motion.   Skin:     General: Skin is warm and dry.   Neurological:      General: No focal deficit present.      Mental Status: He is alert and oriented to person, place, and time. Mental status is at baseline.   Psychiatric:         Mood and Affect: Mood normal.       Current Outpatient Medications on File Prior to Visit   Medication Sig Dispense Refill    aspirin (ECOTRIN) 81 MG EC tablet Take 81 mg by mouth once daily.      cholecalciferol, vitamin D3, (D3-5000 ORAL) Take by mouth.      naproxen sodium (ANAPROX) 220 MG tablet Take 220 mg by mouth 2 (two) times daily with meals.      omega-3/dha/epa/fish oil (OMEGA-3 FISH OIL ORAL) Take by mouth.      streptomycin 1 gram injection SMARTSIG:Topical      UNABLE TO FIND medication name: NUGENIX TOTAL T      UNABLE TO FIND medication name: YELLOW HORNET EXTREME ENERGIZER      UNABLE TO FIND Clindamycin/Mupirocin/Itraconazole  150/20/50mg topical capsule [[id number]]      UNABLE TO FIND SPRAY KIT 10ML      [DISCONTINUED] amLODIPine (NORVASC) 10 MG tablet Take 1 tablet (10 mg total) by mouth once daily. 90 tablet 3    [DISCONTINUED] furosemide (LASIX) 20 MG tablet Take 0.5 tablets (10 mg total) by mouth once daily. 45 tablet 3    [DISCONTINUED] lisinopriL (PRINIVIL,ZESTRIL) 40 MG tablet Take 1 tablet (40 mg total) by mouth once daily. 90 tablet 3    [DISCONTINUED] hyaluronate sodium, stabilized, DUROLANE, (DUROLANE) 60 mg/3 mL Inject 3 mLs (60 mg total) into the articular space As instructed (60 mg to be given in each knee intra articular). (Patient not taking: Reported on 3/28/2024) 6 mL 0     No current facility-administered medications on file prior to visit.     Health Maintenance   Topic Date Due    Hepatitis C Screening  Never done    TETANUS VACCINE  08/23/2024 (Originally 5/5/1973)    Shingles Vaccine (1 of 2) 08/23/2024 (Originally 5/5/2005)    Colorectal Cancer Screening  10/01/2024 (Originally 5/5/2000)    Lipid Panel  06/13/2028      Results for orders placed or performed in visit on 02/18/24   POCT COVID-19 Rapid Screening   Result Value Ref Range    POC Rapid COVID Positive (A) Negative     Acceptable Yes    POCT Influenza A/B Molecular   Result Value Ref Range    POC Molecular Influenza A Ag Negative Negative, Not Reported    POC Molecular Influenza B Ag Negative Negative, Not Reported     Acceptable Yes           Assessment & Plan:     Active Problem List with Overview Notes    Diagnosis Date Noted    Wellness examination 06/12/2024    Colon cancer screening declined 06/12/2024    Bronchitis 06/12/2024    Primary osteoarthritis of both knees 06/12/2024    Bradycardia 10/10/2023    VIC treated with BiPAP 08/23/2023    Onychomycosis 08/23/2023    Class 2 severe obesity due to excess calories with serious comorbidity and body mass index (BMI) of 37.0 to 37.9 in adult 08/23/2023    Hypertension   "      1. Wellness examination  Assessment & Plan:  Fasting labs ordered. Will call with results when available  No results found for: "PSA", "PSATOTAL", "PSAFREE", "PSAFREEPCT"  Declines colon cancer screening  Declines immunizations      2. Primary hypertension  Assessment & Plan:  Well controlled on current prescriptions. taking lisinopril 40mg, norvasc 10mg, and lasix 10mg daily due to report of LE edema. Labs 10/2023 wnl.  Wear compression socks.  Monitor bp at home. Continue on current meds. Refills sent in.      Contact clinic for any concerns. Patient is agreeable to plan and verbalized understanding.     Orders:  -     lisinopriL (PRINIVIL,ZESTRIL) 40 MG tablet; Take 1 tablet (40 mg total) by mouth once daily.  Dispense: 90 tablet; Refill: 3  -     furosemide (LASIX) 20 MG tablet; Take 0.5 tablets (10 mg total) by mouth once daily.  Dispense: 45 tablet; Refill: 3  -     amLODIPine (NORVASC) 10 MG tablet; Take 1 tablet (10 mg total) by mouth once daily.  Dispense: 90 tablet; Refill: 3    3. Primary osteoarthritis of both knees  Assessment & Plan:  Done injections with dr. Villatoro.  Keep appts with ortho.       4. VIC treated with BiPAP  Assessment & Plan:  Reports compliance and benefits from continued use. Follows with dr. harvey        5. Class 2 severe obesity due to excess calories with serious comorbidity and body mass index (BMI) of 37.0 to 37.9 in adult  Assessment & Plan:  Encouraged lifestyle change.  Has lost weight since lov.       6. Colon cancer screening declined  Assessment & Plan:  Declines colon cancer screening      7. Bronchitis  Assessment & Plan:  Reports symptoms intermittently x 1 month.  Negative cxr 3/2024 at [facility].  Asking for steroid shot.  Has had >3 months.  Tolerated well.  Will send in antibiotics due to duration.  Encouraged fluids. Rest.  Monitor symptoms. Contact clinic for concerns. Patient is agreeable to plan and verbalized understanding    Orders:  -     Discontinue: " dexAMETHasone injection 4 mg  -     doxycycline (VIBRA-TABS) 100 MG tablet; Take 1 tablet (100 mg total) by mouth 2 (two) times daily. for 7 days  Dispense: 14 tablet; Refill: 0  -     dexAMETHasone injection 4 mg       Separate complaint outside of wellness visit  CC: He c/o cough x 1 month intermittently.  Daughter is sick with similar symptoms. Sometimes productive.  Took cough syrup otc and caused him to sleep x almost 24 hours. No fever. No sore throat. No ear pain. Had covid 2/2024. Went to [facility] 3/2024 for cough. Cxr negative. Feels like this is a new symptom. Not a continuation of previous illness  ROS: negative other than above  PE: as above  A/P:Reports symptoms intermittently x 1 month.  Negative cxr 3/2024 at [facility].  Asking for steroid shot.  Has had >3 months.  Tolerated well.  Will send in antibiotics due to duration.  Encouraged fluids. Rest.  Monitor symptoms. Contact clinic for concerns. Patient is agreeable to plan and verbalized understanding        Follow up in about 6 months (around 12/12/2024) for chronic conditions.

## 2024-09-16 PROBLEM — Z00.00 WELLNESS EXAMINATION: Status: RESOLVED | Noted: 2024-06-12 | Resolved: 2024-09-16

## 2024-09-17 ENCOUNTER — TELEPHONE (OUTPATIENT)
Dept: FAMILY MEDICINE | Facility: CLINIC | Age: 69
End: 2024-09-17
Payer: COMMERCIAL

## 2024-09-17 NOTE — TELEPHONE ENCOUNTER
Patient last seen in June. Will need appt for BP med adjustment. Please reach out to schedule appt with Dr. Irwin or bruce. Thank you

## 2024-09-17 NOTE — TELEPHONE ENCOUNTER
----- Message from Shantelle Luong sent at 9/17/2024  1:29 PM CDT -----  Regarding: med advice  .Who Called: Arsenio Kang    Caller is requesting assistance/information from provider's office.    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 Pt is currently taking amLODIPine and lisinopriL and needs this issue handled before Friday.

## 2024-09-18 ENCOUNTER — OFFICE VISIT (OUTPATIENT)
Dept: FAMILY MEDICINE | Facility: CLINIC | Age: 69
End: 2024-09-18
Payer: COMMERCIAL

## 2024-09-18 VITALS
SYSTOLIC BLOOD PRESSURE: 133 MMHG | DIASTOLIC BLOOD PRESSURE: 65 MMHG | HEART RATE: 89 BPM | TEMPERATURE: 97 F | BODY MASS INDEX: 38.44 KG/M2 | HEIGHT: 61 IN | RESPIRATION RATE: 18 BRPM | WEIGHT: 203.63 LBS | OXYGEN SATURATION: 98 %

## 2024-09-18 DIAGNOSIS — I10 PRIMARY HYPERTENSION: Primary | ICD-10-CM

## 2024-09-18 PROCEDURE — 3288F FALL RISK ASSESSMENT DOCD: CPT | Mod: CPTII,,, | Performed by: NURSE PRACTITIONER

## 2024-09-18 PROCEDURE — 3078F DIAST BP <80 MM HG: CPT | Mod: CPTII,,, | Performed by: NURSE PRACTITIONER

## 2024-09-18 PROCEDURE — 1160F RVW MEDS BY RX/DR IN RCRD: CPT | Mod: CPTII,,, | Performed by: NURSE PRACTITIONER

## 2024-09-18 PROCEDURE — 1159F MED LIST DOCD IN RCRD: CPT | Mod: CPTII,,, | Performed by: NURSE PRACTITIONER

## 2024-09-18 PROCEDURE — 99213 OFFICE O/P EST LOW 20 MIN: CPT | Mod: ,,, | Performed by: NURSE PRACTITIONER

## 2024-09-18 PROCEDURE — 1101F PT FALLS ASSESS-DOCD LE1/YR: CPT | Mod: CPTII,,, | Performed by: NURSE PRACTITIONER

## 2024-09-18 PROCEDURE — 4010F ACE/ARB THERAPY RXD/TAKEN: CPT | Mod: CPTII,,, | Performed by: NURSE PRACTITIONER

## 2024-09-18 PROCEDURE — 3075F SYST BP GE 130 - 139MM HG: CPT | Mod: CPTII,,, | Performed by: NURSE PRACTITIONER

## 2024-09-18 PROCEDURE — 3008F BODY MASS INDEX DOCD: CPT | Mod: CPTII,,, | Performed by: NURSE PRACTITIONER

## 2024-09-18 PROCEDURE — 1126F AMNT PAIN NOTED NONE PRSNT: CPT | Mod: CPTII,,, | Performed by: NURSE PRACTITIONER

## 2024-09-18 NOTE — PROGRESS NOTES
Subjective:      Patient ID: Arsenio Kang is a 69 y.o. White male      Chief Complaint: Follow-up (Hypertension)       Past Medical History:   Diagnosis Date    Arthritis     Bursitis     Heart murmur     Hypertension     Onychomycosis 08/23/2023    VIC treated with BiPAP 08/23/2023    Primary osteoarthritis of both knees 06/12/2024        HPI  Presents today for evaluation of BP.      Pt contacted clinic due to elevated BP and failed DOT exam.  Pt is currently taking Amlodipine 10 mg po daily, Lisinopril 40 mg po daily, and Furosemide 20 mg (1/2 tablet) po daily.  /65 today.  Pt states he just resumed Lasix a few days ago.  States compliance with medications.  Denies any headaches, dizziness, syncope, CP, or SOB.  Denies any other problems.            Patient Care Team:  Aniya Irwin MD as PCP - General (Family Medicine)  Prince Villatoro MD as Consulting Physician (Orthopedic Surgery)  Avi Haley Sr., MD as Consulting Physician (Internal Medicine)      Review of Systems   Constitutional:  Negative for chills, fatigue, fever and unexpected weight change.   HENT: Negative.     Eyes: Negative.    Respiratory: Negative.  Negative for shortness of breath.    Cardiovascular: Negative.  Negative for chest pain.   Gastrointestinal: Negative.    Endocrine: Negative.    Genitourinary: Negative.    Musculoskeletal: Negative.    Integumentary:  Negative.   Allergic/Immunologic: Negative.    Neurological: Negative.  Negative for weakness.   Hematological: Negative.    Psychiatric/Behavioral: Negative.     All other systems reviewed and are negative.          Objective:      Vitals:    09/18/24 1101   BP: 133/65   Pulse:    Resp:    Temp:       Body mass index is 38.47 kg/m².     Physical Exam  Vitals reviewed.   Constitutional:       Appearance: He is not toxic-appearing.   HENT:      Head: Normocephalic.      Mouth/Throat:      Mouth: Mucous membranes are moist.   Eyes:      Extraocular Movements:  Extraocular movements intact.      Pupils: Pupils are equal, round, and reactive to light.   Cardiovascular:      Rate and Rhythm: Normal rate and regular rhythm.      Pulses: Normal pulses.      Heart sounds: Normal heart sounds.   Pulmonary:      Effort: Pulmonary effort is normal. No respiratory distress.      Breath sounds: Normal breath sounds.   Abdominal:      General: Bowel sounds are normal. There is no distension.      Palpations: Abdomen is soft.      Tenderness: There is no abdominal tenderness.   Musculoskeletal:         General: No tenderness. Normal range of motion.      Cervical back: Neck supple.   Skin:     General: Skin is warm and dry.   Neurological:      Mental Status: He is alert and oriented to person, place, and time.   Psychiatric:         Mood and Affect: Mood normal.            Current Outpatient Medications:     amLODIPine (NORVASC) 10 MG tablet, Take 1 tablet (10 mg total) by mouth once daily., Disp: 90 tablet, Rfl: 3    aspirin (ECOTRIN) 81 MG EC tablet, Take 81 mg by mouth once daily., Disp: , Rfl:     furosemide (LASIX) 20 MG tablet, Take 0.5 tablets (10 mg total) by mouth once daily., Disp: 45 tablet, Rfl: 3    UNABLE TO FIND, medication name: NUGENIX TOTAL T, Disp: , Rfl:     UNABLE TO FIND, medication name: YELLOW HORNET EXTREME ENERGIZER, Disp: , Rfl:     UNABLE TO FIND, Clindamycin/Mupirocin/Itraconazole 150/20/50mg topical capsule [[id number]], Disp: , Rfl:     Assessment & Plan:     Problem List Items Addressed This Visit          Cardiac/Vascular    Hypertension - Primary     BP at goal  Currently taking Amlodipine 10 mg po daily, Lisinopril 40 mg po daily, and Furosemide 20 mg (1/2 tablet) po daily.   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 With PCP for  follow up  Patient is agreeable to plan and verbalizes understanding

## 2024-09-18 NOTE — ASSESSMENT & PLAN NOTE
BP at goal  Currently taking Amlodipine 10 mg po daily, Lisinopril 40 mg po daily, and Furosemide 20 mg (1/2 tablet) po daily.   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 With PCP for follow up  Patient is agreeable to plan and verbalizes understanding

## 2024-11-26 ENCOUNTER — TELEPHONE (OUTPATIENT)
Dept: FAMILY MEDICINE | Facility: CLINIC | Age: 69
End: 2024-11-26
Payer: COMMERCIAL

## 2024-11-26 NOTE — TELEPHONE ENCOUNTER
----- Message from Brant sent at 11/26/2024  9:33 AM CST -----  .Type:  Needs Medical Advice    Who Called: [facility]  [place]   Symptoms (please be specific):    How long has patient had these symptoms:    Pharmacy name and phone #:    Would the patient rather a call back or a response via [facility]?   Best Call Back Number: [phone]  Additional Information:  Requested to speak with the nurse re: a script for this patient, please call her back when available. Thanks.

## 2024-11-26 NOTE — TELEPHONE ENCOUNTER
Dr. Irwin verified pt is to be taking 10 mg of Amlodipine daily. Pharmacy notified. Attempted to contact pt to schedule HTN follow up. Unable to leave  at this time.

## 2024-12-12 ENCOUNTER — OFFICE VISIT (OUTPATIENT)
Dept: FAMILY MEDICINE | Facility: CLINIC | Age: 69
End: 2024-12-12
Payer: COMMERCIAL

## 2024-12-12 VITALS
SYSTOLIC BLOOD PRESSURE: 138 MMHG | HEIGHT: 61 IN | HEART RATE: 78 BPM | TEMPERATURE: 98 F | OXYGEN SATURATION: 98 % | BODY MASS INDEX: 38.71 KG/M2 | WEIGHT: 205 LBS | DIASTOLIC BLOOD PRESSURE: 78 MMHG | RESPIRATION RATE: 16 BRPM

## 2024-12-12 DIAGNOSIS — Z12.5 PROSTATE CANCER SCREENING: ICD-10-CM

## 2024-12-12 DIAGNOSIS — Z00.00 WELLNESS EXAMINATION: ICD-10-CM

## 2024-12-12 DIAGNOSIS — G47.33 OSA TREATED WITH BIPAP: ICD-10-CM

## 2024-12-12 DIAGNOSIS — I10 PRIMARY HYPERTENSION: Primary | ICD-10-CM

## 2024-12-12 DIAGNOSIS — E66.812 CLASS 2 SEVERE OBESITY DUE TO EXCESS CALORIES WITH SERIOUS COMORBIDITY AND BODY MASS INDEX (BMI) OF 38.0 TO 38.9 IN ADULT: ICD-10-CM

## 2024-12-12 DIAGNOSIS — E66.01 CLASS 2 SEVERE OBESITY DUE TO EXCESS CALORIES WITH SERIOUS COMORBIDITY AND BODY MASS INDEX (BMI) OF 38.0 TO 38.9 IN ADULT: ICD-10-CM

## 2024-12-12 RX ORDER — LISINOPRIL 40 MG/1
40 TABLET ORAL DAILY
Qty: 90 TABLET | Refills: 3 | Status: SHIPPED | OUTPATIENT
Start: 2024-12-12 | End: 2025-12-12

## 2024-12-12 RX ORDER — FUROSEMIDE 20 MG/1
10 TABLET ORAL DAILY
Qty: 45 TABLET | Refills: 3 | Status: SHIPPED | OUTPATIENT
Start: 2024-12-12 | End: 2025-12-12

## 2024-12-12 RX ORDER — LISINOPRIL 40 MG/1
40 TABLET ORAL
COMMUNITY
Start: 2024-09-24 | End: 2024-12-12 | Stop reason: SDUPTHER

## 2024-12-12 RX ORDER — AMLODIPINE BESYLATE 10 MG/1
10 TABLET ORAL DAILY
Qty: 90 TABLET | Refills: 3 | Status: SHIPPED | OUTPATIENT
Start: 2024-12-12 | End: 2025-12-12

## 2024-12-12 NOTE — PROGRESS NOTES
Subjective:        Patient ID: Arsenio Kang is a 69 y.o. male.    Chief Complaint: Follow-up (6 month follow up chronic conditions )      Patient presents to clinic unaccompanied for bp follow up.  He is due for his wellness visit in June. He has not completed his wellness labs yet. Will go do asap.        He has htn.  Currently taking lisinopril 40mg, norvasc 10mg, and lasix 10mg daily.  Feeling well. Wearing compression socks. Swelling is improved. Home bp log reviewed. Still having high readings per home log. Reading here in clinic is wnl. Denies chest pain or sob. Still working      He has guille and is on bipap.  Follows with dr. Haley for this.      He is obese.      He had right shoulder surgery with dr. Villatoro 10/2022. Did PT.  History of back surgery.  Has also seen him for bilateral knee OA and done injections with him.      Never had colon cancer screening. Declines as this time.      He works as a .     He has cataracts. Saw dr. Pittman office. Not sure if he will do now due to cost.     He does not smoke. He is allergic to honey bees and poison ivy.         Review of Systems   Constitutional: Negative.    HENT: Negative.     Eyes: Negative.    Respiratory: Negative.  Negative for shortness of breath.    Cardiovascular:  Positive for leg swelling. Negative for chest pain.   Gastrointestinal: Negative.    Endocrine: Negative.    Genitourinary: Negative.    Musculoskeletal: Negative.    Skin: Negative.    Allergic/Immunologic: Negative.    Neurological: Negative.    Hematological: Negative.    Psychiatric/Behavioral: Negative.     All other systems reviewed and are negative.        Review of patient's allergies indicates:   Allergen Reactions    Poison ivy [vit e-nonoxynol 9-aloe vera]     Venom-honey bee       Vitals:    12/12/24 1541   BP: 138/78   BP Location: Left arm   Patient Position: Sitting   Pulse: 78   Resp: 16   Temp: 98 °F (36.7 °C)   TempSrc: Temporal   SpO2: 98%   Weight: 93 kg  "(205 lb)   Height: 5' 1" (1.549 m)      Social History     Socioeconomic History    Marital status: Unknown   Tobacco Use    Smoking status: Never    Smokeless tobacco: Never   Substance and Sexual Activity    Alcohol use: Yes     Comment: Socially    Drug use: Never    Sexual activity: Not Currently      Family History   Problem Relation Name Age of Onset    Lung cancer Mother      Heart attack Father      Hypertension Father      No Known Problems Sister      Coronary artery disease Brother            Objective:     Physical Exam  Vitals and nursing note reviewed.   Constitutional:       Appearance: Normal appearance. He is obese.   HENT:      Head: Normocephalic.      Nose: Nose normal.      Mouth/Throat:      Mouth: Mucous membranes are moist.      Pharynx: Oropharynx is clear.   Eyes:      Extraocular Movements: Extraocular movements intact.   Cardiovascular:      Rate and Rhythm: Normal rate and regular rhythm.   Pulmonary:      Effort: Pulmonary effort is normal.      Breath sounds: Normal breath sounds.   Musculoskeletal:         General: Normal range of motion.   Skin:     General: Skin is warm and dry.   Neurological:      General: No focal deficit present.      Mental Status: He is alert and oriented to person, place, and time. Mental status is at baseline.   Psychiatric:         Mood and Affect: Mood normal.       Current Outpatient Medications on File Prior to Visit   Medication Sig Dispense Refill    aspirin (ECOTRIN) 81 MG EC tablet Take 81 mg by mouth once daily.      UNABLE TO FIND medication name: NUGENIX TOTAL T      UNABLE TO FIND medication name: YELLOW HORNET EXTREME ENERGIZER      [DISCONTINUED] amLODIPine (NORVASC) 10 MG tablet Take 1 tablet (10 mg total) by mouth once daily. 90 tablet 3    [DISCONTINUED] furosemide (LASIX) 20 MG tablet Take 0.5 tablets (10 mg total) by mouth once daily. 45 tablet 3    [DISCONTINUED] lisinopriL (PRINIVIL,ZESTRIL) 40 MG tablet Take 40 mg by mouth.       No " current facility-administered medications on file prior to visit.     Health Maintenance   Topic Date Due    Hepatitis C Screening  Never done    Hemoglobin A1c (Diabetic Prevention Screening)  Never done    Colorectal Cancer Screening  Never done    COVID-19 Vaccine (3 - 2024-25 season) 09/01/2024    RSV Vaccine (Age 60+ and Pregnant patients) (1 - Risk 60-74 years 1-dose series) 12/12/2024 (Originally 5/5/2015)    TETANUS VACCINE  12/12/2025 (Originally [birth year]/5/1973)    Shingles Vaccine (1 of 2) 12/12/2025 (Originally 5/5/2005)    Pneumococcal Vaccines (Age 65+) (1 of 1 - PCV) 12/12/2025 (Originally 5/5/2020)    Lipid Panel  06/13/2028    Influenza Vaccine  Addressed      Results for orders placed or performed in visit on 02/18/24   POCT COVID-19 Rapid Screening    Collection Time: 02/18/24 11:34 AM   Result Value Ref Range    POC Rapid COVID Positive (A) Negative     Acceptable Yes    POCT Influenza A/B Molecular    Collection Time: 02/18/24 11:42 AM   Result Value Ref Range    POC Molecular Influenza A Ag Negative Negative, Not Reported    POC Molecular Influenza B Ag Negative Negative, Not Reported     Acceptable Yes           Assessment & Plan:     Active Problem List with Overview Notes    Diagnosis Date Noted    Colon cancer screening declined 06/12/2024    Bronchitis 06/12/2024    Primary osteoarthritis of both knees 06/12/2024    Bradycardia 10/10/2023    VIC treated with BiPAP 08/23/2023    Onychomycosis 08/23/2023    Class 2 severe obesity due to excess calories with serious comorbidity and body mass index (BMI) of 38.0 to 38.9 in adult 08/23/2023    Hypertension        1. Primary hypertension  Assessment & Plan:  Well controlled on current prescriptions. taking lisinopril 40mg, norvasc 10mg, and lasix 10mg daily due to report of LE edema. Labs 10/2023 wnl.  Wear compression socks.  Monitor bp at home. Continue on current meds. Refills sent in.      Contact clinic for any  concerns. Patient is agreeable to plan and verbalized understanding.     Orders:  -     CBC Auto Differential; Future; Expected date: 12/12/2024  -     Comprehensive Metabolic Panel; Future; Expected date: 12/12/2024  -     Lipid Panel; Future; Expected date: 12/12/2024  -     TSH; Future; Expected date: 12/12/2024  -     Hemoglobin A1C; Future; Expected date: 12/12/2024  -     Urinalysis; Future; Expected date: 12/12/2024  -     PSA, Screening; Future; Expected date: 12/12/2024  -     Hepatitis C Antibody; Future; Expected date: 12/12/2024  -     amLODIPine (NORVASC) 10 MG tablet; Take 1 tablet (10 mg total) by mouth once daily.  Dispense: 90 tablet; Refill: 3  -     lisinopriL (PRINIVIL,ZESTRIL) 40 MG tablet; Take 1 tablet (40 mg total) by mouth once daily.  Dispense: 90 tablet; Refill: 3  -     furosemide (LASIX) 20 MG tablet; Take 0.5 tablets (10 mg total) by mouth once daily.  Dispense: 45 tablet; Refill: 3    2. Class 2 severe obesity due to excess calories with serious comorbidity and body mass index (BMI) of 38.0 to 38.9 in adult  Assessment & Plan:  Encouraged lifestyle change.       Orders:  -     CBC Auto Differential; Future; Expected date: 12/12/2024  -     Comprehensive Metabolic Panel; Future; Expected date: 12/12/2024  -     Lipid Panel; Future; Expected date: 12/12/2024  -     TSH; Future; Expected date: 12/12/2024  -     Hemoglobin A1C; Future; Expected date: 12/12/2024  -     Urinalysis; Future; Expected date: 12/12/2024  -     PSA, Screening; Future; Expected date: 12/12/2024  -     Hepatitis C Antibody; Future; Expected date: 12/12/2024    3. VIC treated with BiPAP  Assessment & Plan:  Reports compliance and benefits from continued use. Follows with dr. harvey      Orders:  -     CBC Auto Differential; Future; Expected date: 12/12/2024  -     Comprehensive Metabolic Panel; Future; Expected date: 12/12/2024  -     Lipid Panel; Future; Expected date: 12/12/2024  -     TSH; Future; Expected date:  12/12/2024  -     Hemoglobin A1C; Future; Expected date: 12/12/2024  -     Urinalysis; Future; Expected date: 12/12/2024  -     PSA, Screening; Future; Expected date: 12/12/2024  -     Hepatitis C Antibody; Future; Expected date: 12/12/2024    4. Wellness examination  -     CBC Auto Differential; Future; Expected date: 12/12/2024  -     Comprehensive Metabolic Panel; Future; Expected date: 12/12/2024  -     Lipid Panel; Future; Expected date: 12/12/2024  -     TSH; Future; Expected date: 12/12/2024  -     Hemoglobin A1C; Future; Expected date: 12/12/2024  -     Urinalysis; Future; Expected date: 12/12/2024  -     PSA, Screening; Future; Expected date: 12/12/2024  -     Hepatitis C Antibody; Future; Expected date: 12/12/2024    5. Prostate cancer screening  -     CBC Auto Differential; Future; Expected date: 12/12/2024  -     Comprehensive Metabolic Panel; Future; Expected date: 12/12/2024  -     Lipid Panel; Future; Expected date: 12/12/2024  -     TSH; Future; Expected date: 12/12/2024  -     Hemoglobin A1C; Future; Expected date: 12/12/2024  -     Urinalysis; Future; Expected date: 12/12/2024  -     PSA, Screening; Future; Expected date: 12/12/2024  -     Hepatitis C Antibody; Future; Expected date: 12/12/2024         Follow up in about 6 months (around 6/12/2025).

## 2024-12-13 ENCOUNTER — LAB VISIT (OUTPATIENT)
Dept: LAB | Facility: HOSPITAL | Age: 69
End: 2024-12-13
Attending: FAMILY MEDICINE
Payer: COMMERCIAL

## 2024-12-13 DIAGNOSIS — E66.812 CLASS 2 SEVERE OBESITY DUE TO EXCESS CALORIES WITH SERIOUS COMORBIDITY AND BODY MASS INDEX (BMI) OF 38.0 TO 38.9 IN ADULT: ICD-10-CM

## 2024-12-13 DIAGNOSIS — I10 PRIMARY HYPERTENSION: ICD-10-CM

## 2024-12-13 DIAGNOSIS — Z12.5 PROSTATE CANCER SCREENING: ICD-10-CM

## 2024-12-13 DIAGNOSIS — Z00.00 WELLNESS EXAMINATION: ICD-10-CM

## 2024-12-13 DIAGNOSIS — E66.01 CLASS 2 SEVERE OBESITY DUE TO EXCESS CALORIES WITH SERIOUS COMORBIDITY AND BODY MASS INDEX (BMI) OF 38.0 TO 38.9 IN ADULT: ICD-10-CM

## 2024-12-13 DIAGNOSIS — G47.33 OSA TREATED WITH BIPAP: ICD-10-CM

## 2024-12-13 LAB
ALBUMIN SERPL-MCNC: 4.1 G/DL (ref 3.4–4.8)
ALBUMIN/GLOB SERPL: 1.2 RATIO (ref 1.1–2)
ALP SERPL-CCNC: 98 UNIT/L (ref 40–150)
ALT SERPL-CCNC: 22 UNIT/L (ref 0–55)
ANION GAP SERPL CALC-SCNC: 9 MEQ/L
AST SERPL-CCNC: 21 UNIT/L (ref 5–34)
BASOPHILS # BLD AUTO: 0.06 X10(3)/MCL
BASOPHILS NFR BLD AUTO: 0.9 %
BILIRUB SERPL-MCNC: 1.1 MG/DL
BUN SERPL-MCNC: 16.9 MG/DL (ref 8.4–25.7)
CALCIUM SERPL-MCNC: 10 MG/DL (ref 8.8–10)
CHLORIDE SERPL-SCNC: 105 MMOL/L (ref 98–107)
CHOLEST SERPL-MCNC: 212 MG/DL
CHOLEST/HDLC SERPL: 4 {RATIO} (ref 0–5)
CO2 SERPL-SCNC: 25 MMOL/L (ref 23–31)
CREAT SERPL-MCNC: 1.09 MG/DL (ref 0.72–1.25)
CREAT/UREA NIT SERPL: 16
EOSINOPHIL # BLD AUTO: 0.18 X10(3)/MCL (ref 0–0.9)
EOSINOPHIL NFR BLD AUTO: 2.6 %
ERYTHROCYTE [DISTWIDTH] IN BLOOD BY AUTOMATED COUNT: 14.7 % (ref 11.5–17)
EST. AVERAGE GLUCOSE BLD GHB EST-MCNC: 111.2 MG/DL
GFR SERPLBLD CREATININE-BSD FMLA CKD-EPI: >60 ML/MIN/1.73/M2
GLOBULIN SER-MCNC: 3.4 GM/DL (ref 2.4–3.5)
GLUCOSE SERPL-MCNC: 91 MG/DL (ref 82–115)
HBA1C MFR BLD: 5.5 %
HCT VFR BLD AUTO: 44.8 % (ref 42–52)
HCV AB SERPL QL IA: NONREACTIVE
HDLC SERPL-MCNC: 55 MG/DL (ref 35–60)
HGB BLD-MCNC: 14.6 G/DL (ref 14–18)
IMM GRANULOCYTES # BLD AUTO: 0.01 X10(3)/MCL (ref 0–0.04)
IMM GRANULOCYTES NFR BLD AUTO: 0.1 %
LDLC SERPL CALC-MCNC: 137 MG/DL (ref 50–140)
LYMPHOCYTES # BLD AUTO: 2.99 X10(3)/MCL (ref 0.6–4.6)
LYMPHOCYTES NFR BLD AUTO: 43.6 %
MCH RBC QN AUTO: 26.4 PG (ref 27–31)
MCHC RBC AUTO-ENTMCNC: 32.6 G/DL (ref 33–36)
MCV RBC AUTO: 81 FL (ref 80–94)
MONOCYTES # BLD AUTO: 0.69 X10(3)/MCL (ref 0.1–1.3)
MONOCYTES NFR BLD AUTO: 10.1 %
NEUTROPHILS # BLD AUTO: 2.93 X10(3)/MCL (ref 2.1–9.2)
NEUTROPHILS NFR BLD AUTO: 42.7 %
NRBC BLD AUTO-RTO: 0 %
PLATELET # BLD AUTO: 256 X10(3)/MCL (ref 130–400)
PMV BLD AUTO: 9.8 FL (ref 7.4–10.4)
POTASSIUM SERPL-SCNC: 4.5 MMOL/L (ref 3.5–5.1)
PROT SERPL-MCNC: 7.5 GM/DL (ref 5.8–7.6)
PSA SERPL-MCNC: 3.18 NG/ML
RBC # BLD AUTO: 5.53 X10(6)/MCL (ref 4.7–6.1)
SODIUM SERPL-SCNC: 139 MMOL/L (ref 136–145)
TRIGL SERPL-MCNC: 102 MG/DL (ref 34–140)
TSH SERPL-ACNC: 1.5 UIU/ML (ref 0.35–4.94)
VLDLC SERPL CALC-MCNC: 20 MG/DL
WBC # BLD AUTO: 6.86 X10(3)/MCL (ref 4.5–11.5)

## 2024-12-13 PROCEDURE — 80053 COMPREHEN METABOLIC PANEL: CPT

## 2024-12-13 PROCEDURE — 36415 COLL VENOUS BLD VENIPUNCTURE: CPT

## 2024-12-13 PROCEDURE — 80061 LIPID PANEL: CPT

## 2024-12-13 PROCEDURE — 84443 ASSAY THYROID STIM HORMONE: CPT

## 2024-12-13 PROCEDURE — 83036 HEMOGLOBIN GLYCOSYLATED A1C: CPT

## 2024-12-13 PROCEDURE — 85025 COMPLETE CBC W/AUTO DIFF WBC: CPT

## 2024-12-13 PROCEDURE — 84153 ASSAY OF PSA TOTAL: CPT

## 2024-12-13 PROCEDURE — 86803 HEPATITIS C AB TEST: CPT

## 2024-12-31 ENCOUNTER — TELEPHONE (OUTPATIENT)
Dept: FAMILY MEDICINE | Facility: CLINIC | Age: 69
End: 2024-12-31
Payer: COMMERCIAL

## 2024-12-31 NOTE — TELEPHONE ENCOUNTER
----- Message from [facility] sent at 12/30/2024  4:00 PM CST -----  .Who Called: Arsenio Kang    Caller is requesting assistance/information from provider's office.    Symptoms (please be specific): calling because he got a letter in the mail about pt's test results    How long has patient had these symptoms:    List of preferred pharmacies on file (remove unneeded): [unfilled]  If different, enter pharmacy into here including location and phone number:       Preferred Method of Contact: Phone Call  Patient's Preferred Phone Number on File: [phone]   Best Call Back Number, if different:  Additional Information:

## 2025-01-20 LAB
CHOLEST SERPL-MSCNC: 193 MG/DL (ref 0–200)
CHOLEST SERPL-MSCNC: 193 MG/DL (ref 0–200)
HBA1C MFR BLD: 5.6 % (ref 4–6)
HBA1C MFR BLD: 5.6 % (ref 4–6)
HDLC SERPL-MCNC: 56 MG/DL (ref 35–70)
HDLC SERPL-MCNC: 56 MG/DL (ref 35–70)
LDLC SERPL CALC-MCNC: 138 MG/DL (ref 0–160)
LDLC SERPL CALC-MCNC: 138 MG/DL (ref 0–160)
TRIGL SERPL-MCNC: 65 MG/DL (ref 40–160)
TRIGL SERPL-MCNC: 65 MG/DL (ref 40–160)

## 2025-01-30 ENCOUNTER — DOCUMENTATION ONLY (OUTPATIENT)
Dept: FAMILY MEDICINE | Facility: CLINIC | Age: 70
End: 2025-01-30
Payer: COMMERCIAL

## 2025-02-25 ENCOUNTER — HOSPITAL ENCOUNTER (OUTPATIENT)
Facility: HOSPITAL | Age: 70
Discharge: HOME | End: 2025-02-26
Attending: EMERGENCY MEDICINE | Admitting: STUDENT IN AN ORGANIZED HEALTH CARE EDUCATION/TRAINING PROGRAM
Payer: COMMERCIAL

## 2025-02-25 DIAGNOSIS — S22.20XA STERNAL FRACTURE: Primary | ICD-10-CM

## 2025-02-25 DIAGNOSIS — S29.9XXA BLUNT TRAUMATIC INJURY OF THORACO-ABDOMINO-PELVIC REGION: ICD-10-CM

## 2025-02-25 DIAGNOSIS — S39.91XA BLUNT TRAUMATIC INJURY OF THORACO-ABDOMINO-PELVIC REGION: ICD-10-CM

## 2025-02-25 DIAGNOSIS — S39.93XA BLUNT TRAUMATIC INJURY OF THORACO-ABDOMINO-PELVIC REGION: ICD-10-CM

## 2025-02-25 DIAGNOSIS — R07.9 ACUTE CHEST PAIN: ICD-10-CM

## 2025-02-25 DIAGNOSIS — S00.83XA: ICD-10-CM
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
## 2025-02-25 DIAGNOSIS — S05.11XA: ICD-10-CM

## 2025-02-25 LAB
ABORH RETYPE: NORMAL
ACCEPTIBLE SP GR UR QL: >1.05 (ref 1–1.03)
ALBUMIN SERPL-MCNC: 3.6 G/DL (ref 3.4–4.8)
ALBUMIN/GLOB SERPL: 1.4 RATIO (ref 1.1–2)
ALP SERPL-CCNC: 86 UNIT/L (ref 40–150)
ALT SERPL-CCNC: 18 UNIT/L (ref 0–55)
AMPHET UR QL SCN: NEGATIVE
ANION GAP SERPL CALC-SCNC: 9 MEQ/L
APICAL FOUR CHAMBER EJECTION FRACTION: 65 %
APICAL TWO CHAMBER EJECTION FRACTION: 76 %
APTT PPP: 27.5 SECONDS (ref 23.2–33.7)
AST SERPL-CCNC: 24 UNIT/L (ref 5–34)
AV INDEX (PROSTH): 0.72
AV MEAN GRADIENT: 4 MMHG
AV PEAK GRADIENT: 8 MMHG
AV VALVE AREA BY VELOCITY RATIO: 2.5 CM²
AV VALVE AREA: 2.5 CM²
AV VELOCITY RATIO: 0.71
BACTERIA #/AREA URNS AUTO: ABNORMAL /HPF
BARBITURATE SCN PRESENT UR: NEGATIVE
BASOPHILS # BLD AUTO: 0.05 X10(3)/MCL
BASOPHILS NFR BLD AUTO: 0.6 %
BENZODIAZ UR QL SCN: NEGATIVE
BILIRUB SERPL-MCNC: 0.4 MG/DL
BILIRUB UR QL STRIP.AUTO: NEGATIVE
BSA FOR ECHO PROCEDURE: 1.98 M2
BUN SERPL-MCNC: 16 MG/DL (ref 8.4–25.7)
CALCIUM SERPL-MCNC: 9.2 MG/DL (ref 8.8–10)
CANNABINOIDS UR QL SCN: NEGATIVE
CHLORIDE SERPL-SCNC: 108 MMOL/L (ref 98–107)
CLARITY UR: CLEAR
CO2 SERPL-SCNC: 22 MMOL/L (ref 23–31)
COCAINE UR QL SCN: NEGATIVE
COLOR UR AUTO: ABNORMAL
CREAT SERPL-MCNC: 0.86 MG/DL (ref 0.72–1.25)
CREAT/UREA NIT SERPL: 19
CV ECHO LV RWT: 0.54 CM
DOP CALC AO PEAK VEL: 1.4 M/S
DOP CALC AO VTI: 32.6 CM
DOP CALC LVOT AREA: 3.5 CM2
DOP CALC LVOT DIAMETER: 2.1 CM
DOP CALC LVOT PEAK VEL: 1 M/S
DOP CALC LVOT STROKE VOLUME: 81.7 CM3
DOP CALC MV VTI: 33.4 CM
DOP CALCLVOT PEAK VEL VTI: 23.6 CM
E WAVE DECELERATION TIME: 230 MSEC
E/A RATIO: 1.07
E/E' RATIO: 9 M/S
ECHO LV POSTERIOR WALL: 1 CM (ref 0.6–1.1)
EOSINOPHIL # BLD AUTO: 0.21 X10(3)/MCL (ref 0–0.9)
EOSINOPHIL NFR BLD AUTO: 2.6 %
ERYTHROCYTE [DISTWIDTH] IN BLOOD BY AUTOMATED COUNT: 15.1 % (ref 11.5–17)
ETHANOL SERPL-MCNC: <10 MG/DL
FENTANYL UR QL SCN: POSITIVE
FRACTIONAL SHORTENING: 32.4 % (ref 28–44)
GFR SERPLBLD CREATININE-BSD FMLA CKD-EPI: >60 ML/MIN/1.73/M2
GLOBULIN SER-MCNC: 2.5 GM/DL (ref 2.4–3.5)
GLUCOSE SERPL-MCNC: 105 MG/DL (ref 82–115)
GLUCOSE UR QL STRIP: NORMAL
GROUP & RH: NORMAL
HCT VFR BLD AUTO: 40.6 % (ref 42–52)
HGB BLD-MCNC: 13.1 G/DL (ref 14–18)
HGB UR QL STRIP: NEGATIVE
IMM GRANULOCYTES # BLD AUTO: 0.05 X10(3)/MCL (ref 0–0.04)
IMM GRANULOCYTES NFR BLD AUTO: 0.6 %
INDIRECT COOMBS: NORMAL
INR PPP: 1
INTERVENTRICULAR SEPTUM: 0.9 CM (ref 0.6–1.1)
KETONES UR QL STRIP: NEGATIVE
LACTATE SERPL-SCNC: 1.2 MMOL/L (ref 0.5–2.2)
LEFT ATRIUM AREA SYSTOLIC (APICAL 4 CHAMBER): 19.6 CM2
LEFT ATRIUM SIZE: 4.3 CM
LEFT INTERNAL DIMENSION IN SYSTOLE: 2.5 CM (ref 2.1–4)
LEFT VENTRICLE DIASTOLIC VOLUME INDEX: 30.69 ML/M2
LEFT VENTRICLE DIASTOLIC VOLUME: 58 ML
LEFT VENTRICLE END DIASTOLIC VOLUME APICAL 2 CHAMBER: 80.7 ML
LEFT VENTRICLE END DIASTOLIC VOLUME APICAL 4 CHAMBER: 76.1 ML
LEFT VENTRICLE END SYSTOLIC VOLUME APICAL 4 CHAMBER: 57.1 ML
LEFT VENTRICLE MASS INDEX: 55.3 G/M2
LEFT VENTRICLE SYSTOLIC VOLUME INDEX: 11.6 ML/M2
LEFT VENTRICLE SYSTOLIC VOLUME: 22 ML
LEFT VENTRICULAR INTERNAL DIMENSION IN DIASTOLE: 3.7 CM (ref 3.5–6)
LEFT VENTRICULAR MASS: 104.6 G
LEUKOCYTE ESTERASE UR QL STRIP: NEGATIVE
LV LATERAL E/E' RATIO: 7.1 M/S
LV SEPTAL E/E' RATIO: 11.1 M/S
LVED V (TEICH): 58.1 ML
LVES V (TEICH): 22.3 ML
LVOT MG: 2 MMHG
LVOT MV: 0.64 CM/S
LYMPHOCYTES # BLD AUTO: 1.97 X10(3)/MCL (ref 0.6–4.6)
LYMPHOCYTES NFR BLD AUTO: 24.6 %
MCH RBC QN AUTO: 26.7 PG (ref 27–31)
MCHC RBC AUTO-ENTMCNC: 32.3 G/DL (ref 33–36)
MCV RBC AUTO: 82.7 FL (ref 80–94)
MDMA UR QL SCN: NEGATIVE
MONOCYTES # BLD AUTO: 0.79 X10(3)/MCL (ref 0.1–1.3)
MONOCYTES NFR BLD AUTO: 9.9 %
MUCOUS THREADS URNS QL MICRO: ABNORMAL /LPF
MV MEAN GRADIENT: 1 MMHG
MV PEAK A VEL: 0.73 M/S
MV PEAK E VEL: 0.78 M/S
MV PEAK GRADIENT: 3 MMHG
MV VALVE AREA BY CONTINUITY EQUATION: 2.45 CM2
NEUTROPHILS # BLD AUTO: 4.94 X10(3)/MCL (ref 2.1–9.2)
NEUTROPHILS NFR BLD AUTO: 61.7 %
NITRITE UR QL STRIP: NEGATIVE
NRBC BLD AUTO-RTO: 0 %
OHS CV RV/LV RATIO: 0.95 CM
OHS QRS DURATION: 86 MS
OHS QTC CALCULATION: 358 MS
OPIATES UR QL SCN: POSITIVE
PCP UR QL: NEGATIVE
PH UR STRIP: 5.5 [PH]
PH UR: 5.5 [PH] (ref 3–11)
PLATELET # BLD AUTO: 234 X10(3)/MCL (ref 130–400)
PMV BLD AUTO: 10.1 FL (ref 7.4–10.4)
POTASSIUM SERPL-SCNC: 4 MMOL/L (ref 3.5–5.1)
PROT SERPL-MCNC: 6.1 GM/DL (ref 5.8–7.6)
PROT UR QL STRIP: ABNORMAL
PROTHROMBIN TIME: 13 SECONDS (ref 12.5–14.5)
RBC # BLD AUTO: 4.91 X10(6)/MCL (ref 4.7–6.1)
RBC #/AREA URNS AUTO: ABNORMAL /HPF
RIGHT VENTRICLE DIASTOLIC BASEL DIMENSION: 3.5 CM
RIGHT VENTRICULAR END-DIASTOLIC DIMENSION: 3.5 CM
SODIUM SERPL-SCNC: 139 MMOL/L (ref 136–145)
SP GR UR STRIP.AUTO: >1.05 (ref 1–1.03)
SPECIMEN OUTDATE: NORMAL
SQUAMOUS #/AREA URNS LPF: ABNORMAL /HPF
TDI LATERAL: 0.11 M/S
TDI SEPTAL: 0.07 M/S
TDI: 0.09 M/S
TRICUSPID ANNULAR PLANE SYSTOLIC EXCURSION: 2.43 CM
TROPONIN I SERPL-MCNC: <0.01 NG/ML (ref 0–0.04)
UROBILINOGEN UR STRIP-ACNC: NORMAL
WBC # BLD AUTO: 8.01 X10(3)/MCL (ref 4.5–11.5)
WBC #/AREA URNS AUTO: ABNORMAL /HPF
Z-SCORE OF LEFT VENTRICULAR DIMENSION IN END DIASTOLE: -3.58
Z-SCORE OF LEFT VENTRICULAR DIMENSION IN END SYSTOLE: -2.1

## 2025-02-25 PROCEDURE — 96360 HYDRATION IV INFUSION INIT: CPT | Mod: 59

## 2025-02-25 PROCEDURE — 83605 ASSAY OF LACTIC ACID: CPT | Performed by: EMERGENCY MEDICINE

## 2025-02-25 PROCEDURE — 90471 IMMUNIZATION ADMIN: CPT | Performed by: EMERGENCY MEDICINE

## 2025-02-25 PROCEDURE — 63600175 PHARM REV CODE 636 W HCPCS

## 2025-02-25 PROCEDURE — G0378 HOSPITAL OBSERVATION PER HR: HCPCS

## 2025-02-25 PROCEDURE — 94660 CPAP INITIATION&MGMT: CPT

## 2025-02-25 PROCEDURE — 25000003 PHARM REV CODE 250

## 2025-02-25 PROCEDURE — 99900031 HC PATIENT EDUCATION (STAT)

## 2025-02-25 PROCEDURE — 96376 TX/PRO/DX INJ SAME DRUG ADON: CPT

## 2025-02-25 PROCEDURE — 99285 EMERGENCY DEPT VISIT HI MDM: CPT | Mod: 25

## 2025-02-25 PROCEDURE — 96375 TX/PRO/DX INJ NEW DRUG ADDON: CPT

## 2025-02-25 PROCEDURE — 86850 RBC ANTIBODY SCREEN: CPT | Performed by: EMERGENCY MEDICINE

## 2025-02-25 PROCEDURE — 63600175 PHARM REV CODE 636 W HCPCS: Performed by: EMERGENCY MEDICINE

## 2025-02-25 PROCEDURE — 99900035 HC TECH TIME PER 15 MIN (STAT)

## 2025-02-25 PROCEDURE — 93005 ELECTROCARDIOGRAM TRACING: CPT

## 2025-02-25 PROCEDURE — 94799 UNLISTED PULMONARY SVC/PX: CPT

## 2025-02-25 PROCEDURE — 93010 ELECTROCARDIOGRAM REPORT: CPT | Mod: ,,, | Performed by: INTERNAL MEDICINE

## 2025-02-25 PROCEDURE — 85025 COMPLETE CBC W/AUTO DIFF WBC: CPT | Performed by: EMERGENCY MEDICINE

## 2025-02-25 PROCEDURE — 90715 TDAP VACCINE 7 YRS/> IM: CPT | Performed by: EMERGENCY MEDICINE

## 2025-02-25 PROCEDURE — 96374 THER/PROPH/DIAG INJ IV PUSH: CPT

## 2025-02-25 PROCEDURE — 80307 DRUG TEST PRSMV CHEM ANLYZR: CPT | Performed by: EMERGENCY MEDICINE

## 2025-02-25 PROCEDURE — 85610 PROTHROMBIN TIME: CPT | Performed by: EMERGENCY MEDICINE

## 2025-02-25 PROCEDURE — 81001 URINALYSIS AUTO W/SCOPE: CPT | Performed by: EMERGENCY MEDICINE

## 2025-02-25 PROCEDURE — 85730 THROMBOPLASTIN TIME PARTIAL: CPT | Performed by: EMERGENCY MEDICINE

## 2025-02-25 PROCEDURE — 96372 THER/PROPH/DIAG INJ SC/IM: CPT

## 2025-02-25 PROCEDURE — 80053 COMPREHEN METABOLIC PANEL: CPT | Performed by: EMERGENCY MEDICINE

## 2025-02-25 PROCEDURE — 84484 ASSAY OF TROPONIN QUANT: CPT | Performed by: EMERGENCY MEDICINE

## 2025-02-25 PROCEDURE — 27000190 HC CPAP FULL FACE MASK W/VALVE

## 2025-02-25 PROCEDURE — 96361 HYDRATE IV INFUSION ADD-ON: CPT

## 2025-02-25 PROCEDURE — 27100171 HC OXYGEN HIGH FLOW UP TO 24 HOURS

## 2025-02-25 PROCEDURE — 82077 ASSAY SPEC XCP UR&BREATH IA: CPT | Performed by: EMERGENCY MEDICINE

## 2025-02-25 PROCEDURE — 25500020 PHARM REV CODE 255: Performed by: EMERGENCY MEDICINE

## 2025-02-25 RX ORDER — ONDANSETRON HYDROCHLORIDE 2 MG/ML
4 INJECTION, SOLUTION INTRAVENOUS
Status: COMPLETED | OUTPATIENT
Start: 2025-02-25 | End: 2025-02-25

## 2025-02-25 RX ORDER — MORPHINE SULFATE 4 MG/ML
4 INJECTION, SOLUTION INTRAMUSCULAR; INTRAVENOUS
Refills: 0 | Status: COMPLETED | OUTPATIENT
Start: 2025-02-25 | End: 2025-02-25

## 2025-02-25 RX ORDER — FUROSEMIDE 20 MG/1
20 TABLET ORAL DAILY
Status: DISCONTINUED | OUTPATIENT
Start: 2025-02-26 | End: 2025-02-26 | Stop reason: HOSPADM

## 2025-02-25 RX ORDER — ASPIRIN 81 MG/1
81 TABLET ORAL DAILY
Status: DISCONTINUED | OUTPATIENT
Start: 2025-02-26 | End: 2025-02-26 | Stop reason: HOSPADM

## 2025-02-25 RX ORDER — OXYCODONE HYDROCHLORIDE 5 MG/1
5 TABLET ORAL EVERY 4 HOURS PRN
Refills: 0 | Status: DISCONTINUED | OUTPATIENT
Start: 2025-02-25 | End: 2025-02-26 | Stop reason: HOSPADM

## 2025-02-25 RX ORDER — METHOCARBAMOL 500 MG/1
500 TABLET, FILM COATED ORAL EVERY 8 HOURS
Status: DISCONTINUED | OUTPATIENT
Start: 2025-02-25 | End: 2025-02-26 | Stop reason: HOSPADM

## 2025-02-25 RX ORDER — AMLODIPINE BESYLATE 5 MG/1
10 TABLET ORAL DAILY
Status: DISCONTINUED | OUTPATIENT
Start: 2025-02-26 | End: 2025-02-26 | Stop reason: HOSPADM

## 2025-02-25 RX ORDER — OXYCODONE HYDROCHLORIDE 10 MG/1
10 TABLET ORAL EVERY 4 HOURS PRN
Refills: 0 | Status: DISCONTINUED | OUTPATIENT
Start: 2025-02-25 | End: 2025-02-26 | Stop reason: HOSPADM

## 2025-02-25 RX ORDER — METHOCARBAMOL 100 MG/ML
1000 INJECTION, SOLUTION INTRAMUSCULAR; INTRAVENOUS ONCE
Status: COMPLETED | OUTPATIENT
Start: 2025-02-25 | End: 2025-02-25

## 2025-02-25 RX ORDER — DOCUSATE SODIUM 100 MG/1
100 CAPSULE, LIQUID FILLED ORAL 2 TIMES DAILY
Status: DISCONTINUED | OUTPATIENT
Start: 2025-02-25 | End: 2025-02-25

## 2025-02-25 RX ORDER — POLYETHYLENE GLYCOL 3350 17 G/17G
17 POWDER, FOR SOLUTION ORAL 2 TIMES DAILY
Status: DISCONTINUED | OUTPATIENT
Start: 2025-02-25 | End: 2025-02-26 | Stop reason: HOSPADM

## 2025-02-25 RX ORDER — ACETAMINOPHEN 325 MG/1
650 TABLET ORAL EVERY 4 HOURS
Status: DISCONTINUED | OUTPATIENT
Start: 2025-02-25 | End: 2025-02-26 | Stop reason: HOSPADM

## 2025-02-25 RX ORDER — DOCUSATE SODIUM 50 MG/5ML
100 LIQUID ORAL 2 TIMES DAILY
Status: DISCONTINUED | OUTPATIENT
Start: 2025-02-25 | End: 2025-02-26 | Stop reason: HOSPADM

## 2025-02-25 RX ORDER — ADHESIVE BANDAGE
30 BANDAGE TOPICAL DAILY PRN
Status: DISCONTINUED | OUTPATIENT
Start: 2025-02-25 | End: 2025-02-26 | Stop reason: HOSPADM

## 2025-02-25 RX ORDER — LIDOCAINE HYDROCHLORIDE AND EPINEPHRINE 10; 10 UG/ML; MG/ML
1 INJECTION, SOLUTION INFILTRATION; PERINEURAL ONCE
Status: DISCONTINUED | OUTPATIENT
Start: 2025-02-25 | End: 2025-02-25

## 2025-02-25 RX ORDER — TALC
6 POWDER (GRAM) TOPICAL NIGHTLY PRN
Status: DISCONTINUED | OUTPATIENT
Start: 2025-02-25 | End: 2025-02-26 | Stop reason: HOSPADM

## 2025-02-25 RX ORDER — GABAPENTIN 300 MG/1
300 CAPSULE ORAL 3 TIMES DAILY
Status: DISCONTINUED | OUTPATIENT
Start: 2025-02-25 | End: 2025-02-26 | Stop reason: HOSPADM

## 2025-02-25 RX ORDER — SODIUM CHLORIDE, SODIUM LACTATE, POTASSIUM CHLORIDE, CALCIUM CHLORIDE 600; 310; 30; 20 MG/100ML; MG/100ML; MG/100ML; MG/100ML
INJECTION, SOLUTION INTRAVENOUS CONTINUOUS
Status: DISCONTINUED | OUTPATIENT
Start: 2025-02-25 | End: 2025-02-25

## 2025-02-25 RX ORDER — ENOXAPARIN SODIUM 100 MG/ML
40 INJECTION SUBCUTANEOUS EVERY 12 HOURS
Status: DISCONTINUED | OUTPATIENT
Start: 2025-02-25 | End: 2025-02-26 | Stop reason: HOSPADM

## 2025-02-25 RX ADMIN — ENOXAPARIN SODIUM 40 MG: 40 INJECTION SUBCUTANEOUS at 09:02

## 2025-02-25 RX ADMIN — GABAPENTIN 300 MG: 300 CAPSULE ORAL at 09:02

## 2025-02-25 RX ADMIN — Medication 6 MG: at 08:02

## 2025-02-25 RX ADMIN — ACETAMINOPHEN 650 MG: 325 TABLET, FILM COATED ORAL at 08:02

## 2025-02-25 RX ADMIN — MORPHINE SULFATE 4 MG: 4 INJECTION INTRAVENOUS at 05:02

## 2025-02-25 RX ADMIN — DOCUSATE SODIUM 100 MG: 100 CAPSULE, LIQUID FILLED ORAL at 09:02

## 2025-02-25 RX ADMIN — METHOCARBAMOL 500 MG: 500 TABLET ORAL at 08:02

## 2025-02-25 RX ADMIN — CLOSTRIDIUM TETANI TOXOID ANTIGEN (FORMALDEHYDE INACTIVATED), CORYNEBACTERIUM DIPHTHERIAE TOXOID ANTIGEN (FORMALDEHYDE INACTIVATED), BORDETELLA PERTUSSIS TOXOID ANTIGEN (GLUTARALDEHYDE INACTIVATED), BORDETELLA PERTUSSIS FILAMENTOUS HEMAGGLUTININ ANTIGEN (FORMALDEHYDE INACTIVATED), BORDETELLA PERTUSSIS PERTACTIN ANTIGEN, AND BORDETELLA PERTUSSIS FIMBRIAE 2/3 ANTIGEN 0.5 ML: 5; 2; 2.5; 5; 3; 5 INJECTION, SUSPENSION INTRAMUSCULAR at 06:02

## 2025-02-25 RX ADMIN — ONDANSETRON 4 MG: 2 INJECTION INTRAMUSCULAR; INTRAVENOUS at 05:02

## 2025-02-25 RX ADMIN — GABAPENTIN 300 MG: 300 CAPSULE ORAL at 08:02

## 2025-02-25 RX ADMIN — MORPHINE SULFATE 4 MG: 4 INJECTION INTRAVENOUS at 06:02

## 2025-02-25 RX ADMIN — ENOXAPARIN SODIUM 40 MG: 40 INJECTION SUBCUTANEOUS at 08:02

## 2025-02-25 RX ADMIN — METHOCARBAMOL 500 MG: 500 TABLET ORAL at 03:02

## 2025-02-25 RX ADMIN — SODIUM CHLORIDE, POTASSIUM CHLORIDE, SODIUM LACTATE AND CALCIUM CHLORIDE: 600; 310; 30; 20 INJECTION, SOLUTION INTRAVENOUS at 09:02

## 2025-02-25 RX ADMIN — GABAPENTIN 300 MG: 300 CAPSULE ORAL at 03:02

## 2025-02-25 RX ADMIN — METHOCARBAMOL 1000 MG: 100 INJECTION INTRAMUSCULAR; INTRAVENOUS at 06:02

## 2025-02-25 RX ADMIN — ONDANSETRON 4 MG: 2 INJECTION INTRAMUSCULAR; INTRAVENOUS at 06:02

## 2025-02-25 RX ADMIN — ACETAMINOPHEN 650 MG: 325 TABLET, FILM COATED ORAL at 06:02

## 2025-02-25 RX ADMIN — IOHEXOL 100 ML: 350 INJECTION, SOLUTION INTRAVENOUS at 05:02

## 2025-02-25 RX ADMIN — ACETAMINOPHEN 650 MG: 325 TABLET, FILM COATED ORAL at 03:02

## 2025-02-25 RX ADMIN — ACETAMINOPHEN 650 MG: 325 TABLET, FILM COATED ORAL at 09:02
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
## 2025-02-25 NOTE — LETTER
February 26, 2025         [address] [facility]  [place] [address]  Phone: [phone]       Patient: Arsenio Kang   YOB: 1955  Date of Visit: 02/26/2025    To Whom It May Concern:    Jose Kang  was at [facility] on 02/26/2025. The patient may return to work/school on 4/26/25 with no restrictions. If you have any questions or concerns, or if I can be of further assistance, please do not hesitate to contact me.    Sincerely,    Iza Fulton RN
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
## 2025-02-26 ENCOUNTER — TELEPHONE (OUTPATIENT)
Facility: CLINIC | Age: 70
End: 2025-02-26
Payer: COMMERCIAL

## 2025-02-26 VITALS
SYSTOLIC BLOOD PRESSURE: 166 MMHG | HEIGHT: 61 IN | OXYGEN SATURATION: 96 % | BODY MASS INDEX: 37.76 KG/M2 | WEIGHT: 200 LBS | TEMPERATURE: 98 F | HEART RATE: 63 BPM | RESPIRATION RATE: 16 BRPM | DIASTOLIC BLOOD PRESSURE: 77 MMHG

## 2025-02-26 PROBLEM — S22.20XA CLOSED FRACTURE OF STERNUM: Status: ACTIVE | Noted: 2025-02-26

## 2025-02-26 PROBLEM — I31.39 PERICARDIAL EFFUSION: Status: ACTIVE | Noted: 2025-02-26

## 2025-02-26 PROBLEM — T14.8XXA ABRASION: Status: ACTIVE | Noted: 2025-02-26

## 2025-02-26 LAB
ALBUMIN SERPL-MCNC: 3.2 G/DL (ref 3.4–4.8)
ALBUMIN/GLOB SERPL: 1.5 RATIO (ref 1.1–2)
ALP SERPL-CCNC: 69 UNIT/L (ref 40–150)
ALT SERPL-CCNC: 15 UNIT/L (ref 0–55)
ANION GAP SERPL CALC-SCNC: 4 MEQ/L
AST SERPL-CCNC: 20 UNIT/L (ref 5–34)
BASOPHILS # BLD AUTO: 0.02 X10(3)/MCL
BASOPHILS NFR BLD AUTO: 0.3 %
BILIRUB SERPL-MCNC: 0.6 MG/DL
BSA FOR ECHO PROCEDURE: 1.98 M2
BUN SERPL-MCNC: 14.2 MG/DL (ref 8.4–25.7)
CALCIUM SERPL-MCNC: 8.7 MG/DL (ref 8.8–10)
CHLORIDE SERPL-SCNC: 108 MMOL/L (ref 98–107)
CO2 SERPL-SCNC: 23 MMOL/L (ref 23–31)
CREAT SERPL-MCNC: 0.79 MG/DL (ref 0.72–1.25)
CREAT/UREA NIT SERPL: 18
EOSINOPHIL # BLD AUTO: 0.2 X10(3)/MCL (ref 0–0.9)
EOSINOPHIL NFR BLD AUTO: 3.2 %
ERYTHROCYTE [DISTWIDTH] IN BLOOD BY AUTOMATED COUNT: 15.7 % (ref 11.5–17)
GFR SERPLBLD CREATININE-BSD FMLA CKD-EPI: >60 ML/MIN/1.73/M2
GLOBULIN SER-MCNC: 2.2 GM/DL (ref 2.4–3.5)
GLUCOSE SERPL-MCNC: 95 MG/DL (ref 82–115)
HCT VFR BLD AUTO: 37.9 % (ref 42–52)
HGB BLD-MCNC: 11.7 G/DL (ref 14–18)
IMM GRANULOCYTES # BLD AUTO: 0.01 X10(3)/MCL (ref 0–0.04)
IMM GRANULOCYTES NFR BLD AUTO: 0.2 %
LYMPHOCYTES # BLD AUTO: 2.04 X10(3)/MCL (ref 0.6–4.6)
LYMPHOCYTES NFR BLD AUTO: 32.5 %
MAGNESIUM SERPL-MCNC: 2.1 MG/DL (ref 1.6–2.6)
MCH RBC QN AUTO: 26.5 PG (ref 27–31)
MCHC RBC AUTO-ENTMCNC: 30.9 G/DL (ref 33–36)
MCV RBC AUTO: 85.7 FL (ref 80–94)
MONOCYTES # BLD AUTO: 0.71 X10(3)/MCL (ref 0.1–1.3)
MONOCYTES NFR BLD AUTO: 11.3 %
NEUTROPHILS # BLD AUTO: 3.3 X10(3)/MCL (ref 2.1–9.2)
NEUTROPHILS NFR BLD AUTO: 52.5 %
NRBC BLD AUTO-RTO: 0 %
PHOSPHATE SERPL-MCNC: 3.5 MG/DL (ref 2.3–4.7)
PLATELET # BLD AUTO: 183 X10(3)/MCL (ref 130–400)
PMV BLD AUTO: 10.3 FL (ref 7.4–10.4)
POTASSIUM SERPL-SCNC: 4.7 MMOL/L (ref 3.5–5.1)
PROT SERPL-MCNC: 5.4 GM/DL (ref 5.8–7.6)
RBC # BLD AUTO: 4.42 X10(6)/MCL (ref 4.7–6.1)
SODIUM SERPL-SCNC: 135 MMOL/L (ref 136–145)
WBC # BLD AUTO: 6.28 X10(3)/MCL (ref 4.5–11.5)

## 2025-02-26 PROCEDURE — G0378 HOSPITAL OBSERVATION PER HR: HCPCS

## 2025-02-26 PROCEDURE — 80053 COMPREHEN METABOLIC PANEL: CPT

## 2025-02-26 PROCEDURE — 84100 ASSAY OF PHOSPHORUS: CPT

## 2025-02-26 PROCEDURE — 96372 THER/PROPH/DIAG INJ SC/IM: CPT

## 2025-02-26 PROCEDURE — 83735 ASSAY OF MAGNESIUM: CPT

## 2025-02-26 PROCEDURE — 94799 UNLISTED PULMONARY SVC/PX: CPT

## 2025-02-26 PROCEDURE — 25000003 PHARM REV CODE 250: Performed by: NURSE PRACTITIONER

## 2025-02-26 PROCEDURE — 25000003 PHARM REV CODE 250

## 2025-02-26 PROCEDURE — 99900035 HC TECH TIME PER 15 MIN (STAT)

## 2025-02-26 PROCEDURE — 36415 COLL VENOUS BLD VENIPUNCTURE: CPT

## 2025-02-26 PROCEDURE — 85025 COMPLETE CBC W/AUTO DIFF WBC: CPT

## 2025-02-26 PROCEDURE — 27100171 HC OXYGEN HIGH FLOW UP TO 24 HOURS

## 2025-02-26 PROCEDURE — 94760 N-INVAS EAR/PLS OXIMETRY 1: CPT

## 2025-02-26 PROCEDURE — 94660 CPAP INITIATION&MGMT: CPT

## 2025-02-26 PROCEDURE — 63600175 PHARM REV CODE 636 W HCPCS

## 2025-02-26 PROCEDURE — 99900031 HC PATIENT EDUCATION (STAT)

## 2025-02-26 RX ORDER — ACETAMINOPHEN 325 MG/1
650 TABLET ORAL EVERY 6 HOURS PRN
Qty: 40 TABLET | Refills: 0 | Status: SHIPPED | OUTPATIENT
Start: 2025-02-26 | End: 2025-03-03

## 2025-02-26 RX ORDER — GABAPENTIN 300 MG/1
300 CAPSULE ORAL 3 TIMES DAILY
Qty: 90 CAPSULE | Refills: 0 | Status: SHIPPED | OUTPATIENT
Start: 2025-02-26 | End: 2025-03-28

## 2025-02-26 RX ORDER — OXYCODONE HYDROCHLORIDE 5 MG/1
5 TABLET ORAL EVERY 8 HOURS PRN
Qty: 21 TABLET | Refills: 0 | Status: SHIPPED | OUTPATIENT
Start: 2025-02-26 | End: 2025-03-05

## 2025-02-26 RX ORDER — METHOCARBAMOL 500 MG/1
500 TABLET, FILM COATED ORAL EVERY 8 HOURS
Qty: 30 TABLET | Refills: 0 | Status: SHIPPED | OUTPATIENT
Start: 2025-02-26 | End: 2025-03-08

## 2025-02-26 RX ORDER — AMOXICILLIN 250 MG
1 CAPSULE ORAL DAILY
Qty: 7 TABLET | Refills: 0 | Status: SHIPPED | OUTPATIENT
Start: 2025-02-26 | End: 2025-03-05

## 2025-02-26 RX ADMIN — ACETAMINOPHEN 650 MG: 325 TABLET, FILM COATED ORAL at 05:02

## 2025-02-26 RX ADMIN — GABAPENTIN 300 MG: 300 CAPSULE ORAL at 09:02

## 2025-02-26 RX ADMIN — AMLODIPINE BESYLATE 10 MG: 5 TABLET ORAL at 09:02

## 2025-02-26 RX ADMIN — METHOCARBAMOL 500 MG: 500 TABLET ORAL at 05:02

## 2025-02-26 RX ADMIN — ENOXAPARIN SODIUM 40 MG: 40 INJECTION SUBCUTANEOUS at 09:02

## 2025-02-26 RX ADMIN — ACETAMINOPHEN 650 MG: 325 TABLET, FILM COATED ORAL at 09:02

## 2025-02-26 RX ADMIN — FUROSEMIDE 20 MG: 20 TABLET ORAL at 09:02

## 2025-02-26 RX ADMIN — ASPIRIN 81 MG: 81 TABLET, COATED ORAL at 09:02

## 2025-02-26 NOTE — TELEPHONE ENCOUNTER
Name....: Carrie  ......w/: [facility]  Phone #.: [phone]  Patient.: Arsenio Ramos  Pt .: 1955  Details.: asking for the CLAUDIA number    Received this message via [employer]. Spoke with Pharmacy staff and provided necessary information for Rx.

## 2025-02-26 NOTE — PLAN OF CARE
02/26/25 1200   Final Note   Assessment Type Final Discharge Note   Anticipated Discharge Disposition Home   Post-Acute Status   Discharge Delays None known at this time     Pt to d/c home. No CM needs known at this time.    Stephie Luna LCSW

## 2025-02-26 NOTE — PLAN OF CARE
Problem: Adult Inpatient Plan of Care  Goal: Plan of Care Review  Outcome: Progressing  Flowsheets (Taken 2/26/2025 0538)  Plan of Care Reviewed With: patient  Goal: Patient-Specific Goal (Individualized)  Outcome: Progressing  Flowsheets (Taken 2/26/2025 0538)  Individualized Care Needs: cpap  Anxieties, Fears or Concerns: cpap  Patient/Family-Specific Goals (Include Timeframe): na  Goal: Absence of Hospital-Acquired Illness or Injury  Outcome: Progressing  Goal: Optimal Comfort and Wellbeing  Outcome: Progressing  Goal: Readiness for Transition of Care  Outcome: Progressing
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
## 2025-02-27 ENCOUNTER — PATIENT OUTREACH (OUTPATIENT)
Dept: ADMINISTRATIVE | Facility: CLINIC | Age: 70
End: 2025-02-27
Payer: COMMERCIAL

## 2025-02-27 NOTE — PROGRESS NOTES
C3 nurse attempted to contact Arsenio Kang for a TCC post hospital discharge follow up call. No answer. No voicemail available. The patient has a scheduled HOSFU appointment with Aniya Irwin MD (Family Medicine) on 3/5/2025 @10am.

## 2025-02-28 ENCOUNTER — PATIENT MESSAGE (OUTPATIENT)
Dept: ADMINISTRATIVE | Facility: CLINIC | Age: 70
End: 2025-02-28
Payer: COMMERCIAL

## 2025-02-28 NOTE — PROGRESS NOTES
C3 nurse spoke with Arsenio Gaines for a TCC post hospital discharge follow up call. The patient has a scheduled [facility] appointment with Aniya Irwin MD (Family Medicine) on 3/5/2025 @10am.

## 2025-02-28 NOTE — PROGRESS NOTES
2nd attempt- C3 nurse attempted to contact Arsenio Kang for a TCC post hospital discharge follow up call. No answer. No voicemail available. The patient has a scheduled HOSFU appointment with Aniya Irwin MD (Family Medicine) on 3/5/2025 @10am.

## 2025-03-03 ENCOUNTER — TELEPHONE (OUTPATIENT)
Dept: FAMILY MEDICINE | Facility: CLINIC | Age: 70
End: 2025-03-03
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
## 2025-03-07 ENCOUNTER — OFFICE VISIT (OUTPATIENT)
Dept: FAMILY MEDICINE | Facility: CLINIC | Age: 70
End: 2025-03-07
Payer: COMMERCIAL

## 2025-03-07 VITALS
BODY MASS INDEX: 40.1 KG/M2 | DIASTOLIC BLOOD PRESSURE: 78 MMHG | RESPIRATION RATE: 18 BRPM | SYSTOLIC BLOOD PRESSURE: 138 MMHG | HEART RATE: 68 BPM | OXYGEN SATURATION: 98 % | WEIGHT: 212.38 LBS | TEMPERATURE: 98 F | HEIGHT: 61 IN

## 2025-03-07 DIAGNOSIS — E66.812 CLASS 2 SEVERE OBESITY DUE TO EXCESS CALORIES WITH SERIOUS COMORBIDITY AND BODY MASS INDEX (BMI) OF 38.0 TO 38.9 IN ADULT: ICD-10-CM

## 2025-03-07 DIAGNOSIS — S22.20XS CLOSED FRACTURE OF STERNUM, UNSPECIFIED PORTION OF STERNUM, SEQUELA: ICD-10-CM

## 2025-03-07 DIAGNOSIS — Z09 HOSPITAL DISCHARGE FOLLOW-UP: Primary | ICD-10-CM

## 2025-03-07 DIAGNOSIS — Z00.00 WELLNESS EXAMINATION: ICD-10-CM
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
## 2025-03-07 DIAGNOSIS — E66.01 CLASS 2 SEVERE OBESITY DUE TO EXCESS CALORIES WITH SERIOUS COMORBIDITY AND BODY MASS INDEX (BMI) OF 38.0 TO 38.9 IN ADULT: ICD-10-CM

## 2025-03-07 DIAGNOSIS — Z12.5 PROSTATE CANCER SCREENING: ICD-10-CM
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
## 2025-03-07 RX ORDER — OXYCODONE HYDROCHLORIDE 5 MG/1
5 CAPSULE ORAL EVERY 8 HOURS PRN
COMMUNITY

## 2025-03-07 NOTE — ASSESSMENT & PLAN NOTE
Hospital course notes reviewed, pt does not need to follow up with CV surgeon unless worsening. Instructed to keep all other follow up appts as scheduled. Continue taking pain medication as needed. ER precautions discussed. Patient was instructed to be out of work until 4/26/2025, paperwork filled out per hospital.   Patient verbalizes understanding.

## 2025-03-07 NOTE — ASSESSMENT & PLAN NOTE
Improving, keep follow up appts as scheduled.  ER precautions-severe chest pain or SOB.  Patient verbalizes understanding.
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
## 2025-03-07 NOTE — ASSESSMENT & PLAN NOTE
Continue IS, continue pain medications as needed. Continue sternal precautions per hospital discharge which include no heavy lifting or pushing or pulling with arms for 2 months.  ER precautions discussed-severe SOB or chest pain.   Patient verbalizes understanding.

## 2025-04-02 ENCOUNTER — HOSPITAL ENCOUNTER (EMERGENCY)
Facility: HOSPITAL | Age: 70
Discharge: HOME | End: 2025-04-02
Attending: EMERGENCY MEDICINE
Payer: MEDICARE

## 2025-04-02 VITALS
OXYGEN SATURATION: 97 % | TEMPERATURE: 98 F | BODY MASS INDEX: 41.54 KG/M2 | WEIGHT: 220 LBS | HEIGHT: 61 IN | HEART RATE: 64 BPM | RESPIRATION RATE: 13 BRPM | SYSTOLIC BLOOD PRESSURE: 155 MMHG | DIASTOLIC BLOOD PRESSURE: 76 MMHG

## 2025-04-02 DIAGNOSIS — S09.90XA INJURY OF HEAD, INITIAL ENCOUNTER: Primary | ICD-10-CM

## 2025-04-02 DIAGNOSIS — S00.83XA CONTUSION OF FACE, INITIAL ENCOUNTER: ICD-10-CM

## 2025-04-02 DIAGNOSIS — S01.112A LEFT EYELID LACERATION, INITIAL ENCOUNTER: ICD-10-CM

## 2025-04-02 DIAGNOSIS — M79.89 LEG SWELLING: ICD-10-CM

## 2025-04-02 LAB
ALBUMIN SERPL-MCNC: 3.8 G/DL (ref 3.4–4.8)
ALBUMIN/GLOB SERPL: 1.2 RATIO (ref 1.1–2)
ALP SERPL-CCNC: 88 UNIT/L (ref 40–150)
ALT SERPL-CCNC: 17 UNIT/L (ref 0–55)
ANION GAP SERPL CALC-SCNC: 6 MEQ/L
AST SERPL-CCNC: 21 UNIT/L (ref 11–45)
BASOPHILS # BLD AUTO: 0.05 X10(3)/MCL
BASOPHILS NFR BLD AUTO: 0.7 %
BILIRUB SERPL-MCNC: 0.7 MG/DL
BNP BLD-MCNC: 51 PG/ML
BUN SERPL-MCNC: 13.6 MG/DL (ref 8.4–25.7)
CALCIUM SERPL-MCNC: 9.7 MG/DL (ref 8.8–10)
CHLORIDE SERPL-SCNC: 108 MMOL/L (ref 98–107)
CO2 SERPL-SCNC: 26 MMOL/L (ref 23–31)
CREAT SERPL-MCNC: 0.88 MG/DL (ref 0.72–1.25)
CREAT/UREA NIT SERPL: 15
EOSINOPHIL # BLD AUTO: 0.23 X10(3)/MCL (ref 0–0.9)
EOSINOPHIL NFR BLD AUTO: 3.1 %
ERYTHROCYTE [DISTWIDTH] IN BLOOD BY AUTOMATED COUNT: 15.8 % (ref 11.5–17)
FLUAV AG UPPER RESP QL IA.RAPID: NOT DETECTED
FLUBV AG UPPER RESP QL IA.RAPID: NOT DETECTED
GFR SERPLBLD CREATININE-BSD FMLA CKD-EPI: >60 ML/MIN/1.73/M2
GLOBULIN SER-MCNC: 3.2 GM/DL (ref 2.4–3.5)
GLUCOSE SERPL-MCNC: 102 MG/DL (ref 82–115)
HCT VFR BLD AUTO: 41.4 % (ref 42–52)
HGB BLD-MCNC: 13.4 G/DL (ref 14–18)
IMM GRANULOCYTES # BLD AUTO: 0.02 X10(3)/MCL (ref 0–0.04)
IMM GRANULOCYTES NFR BLD AUTO: 0.3 %
LYMPHOCYTES # BLD AUTO: 2.45 X10(3)/MCL (ref 0.6–4.6)
LYMPHOCYTES NFR BLD AUTO: 32.8 %
MAGNESIUM SERPL-MCNC: 2 MG/DL (ref 1.6–2.6)
MCH RBC QN AUTO: 26.5 PG (ref 27–31)
MCHC RBC AUTO-ENTMCNC: 32.4 G/DL (ref 33–36)
MCV RBC AUTO: 82 FL (ref 80–94)
MONOCYTES # BLD AUTO: 1.01 X10(3)/MCL (ref 0.1–1.3)
MONOCYTES NFR BLD AUTO: 13.5 %
NEUTROPHILS # BLD AUTO: 3.7 X10(3)/MCL (ref 2.1–9.2)
NEUTROPHILS NFR BLD AUTO: 49.6 %
NRBC BLD AUTO-RTO: 0 %
PLATELET # BLD AUTO: 222 X10(3)/MCL (ref 130–400)
PMV BLD AUTO: 10.5 FL (ref 7.4–10.4)
POTASSIUM SERPL-SCNC: 4.3 MMOL/L (ref 3.5–5.1)
PROT SERPL-MCNC: 7 GM/DL (ref 5.8–7.6)
RBC # BLD AUTO: 5.05 X10(6)/MCL (ref 4.7–6.1)
RSV A 5' UTR RNA NPH QL NAA+PROBE: NOT DETECTED
SARS-COV-2 RNA RESP QL NAA+PROBE: NOT DETECTED
SODIUM SERPL-SCNC: 140 MMOL/L (ref 136–145)
TROPONIN I SERPL-MCNC: <0.01 NG/ML (ref 0–0.04)
TSH SERPL-ACNC: 1.11 UIU/ML (ref 0.35–4.94)
WBC # BLD AUTO: 7.46 X10(3)/MCL (ref 4.5–11.5)

## 2025-04-02 PROCEDURE — 12011 RPR F/E/E/N/L/M 2.5 CM/<: CPT

## 2025-04-02 PROCEDURE — 84443 ASSAY THYROID STIM HORMONE: CPT | Performed by: EMERGENCY MEDICINE

## 2025-04-02 PROCEDURE — 84484 ASSAY OF TROPONIN QUANT: CPT | Performed by: EMERGENCY MEDICINE

## 2025-04-02 PROCEDURE — 85025 COMPLETE CBC W/AUTO DIFF WBC: CPT | Performed by: EMERGENCY MEDICINE

## 2025-04-02 PROCEDURE — 0241U COVID/RSV/FLU A&B PCR: CPT | Performed by: EMERGENCY MEDICINE

## 2025-04-02 PROCEDURE — 83880 ASSAY OF NATRIURETIC PEPTIDE: CPT | Performed by: EMERGENCY MEDICINE

## 2025-04-02 PROCEDURE — 63600175 PHARM REV CODE 636 W HCPCS: Performed by: EMERGENCY MEDICINE

## 2025-04-02 PROCEDURE — 99284 EMERGENCY DEPT VISIT MOD MDM: CPT | Mod: 25

## 2025-04-02 PROCEDURE — 80053 COMPREHEN METABOLIC PANEL: CPT | Performed by: EMERGENCY MEDICINE

## 2025-04-02 PROCEDURE — 83735 ASSAY OF MAGNESIUM: CPT | Performed by: EMERGENCY MEDICINE

## 2025-04-02 RX ORDER — LIDOCAINE HYDROCHLORIDE 20 MG/ML
10 INJECTION, SOLUTION EPIDURAL; INFILTRATION; INTRACAUDAL; PERINEURAL
Status: COMPLETED | OUTPATIENT
Start: 2025-04-02 | End: 2025-04-02

## 2025-04-02 RX ORDER — ONDANSETRON 4 MG/1
4 TABLET, ORALLY DISINTEGRATING ORAL ONCE
Qty: 1 TABLET | Refills: 0 | Status: SHIPPED | OUTPATIENT
Start: 2025-04-02 | End: 2025-04-02

## 2025-04-02 RX ADMIN — LIDOCAINE HYDROCHLORIDE 200 MG: 20 INJECTION, SOLUTION EPIDURAL; INFILTRATION; INTRACAUDAL; PERINEURAL at 09:04

## 2025-04-02 NOTE — DISCHARGE INSTRUCTIONS
Increase Lasix to 20 mg a day for 3 or 4 days.  You also need to wear compression stockings and keep feet elevated as much as possible  Keep your hilar dry for the next 24 hours.  After that be sure to wash with a warm water.  Sutures are absorbable so no need to return for removal

## 2025-04-02 NOTE — ED PROVIDER NOTES
Encounter Date: 4/2/2025       History     Chief Complaint   Patient presents with    Fall     Pt started with vomiting at 0700 today; slipped in vomit, fell, hit face on weight; +loc; currently aaox4; - thinners; bruising and swelling noted around left eye; states that vision is milky in left eye     69-year-old gentleman history hypertension, sleep apnea was in his usual state of health until around 740 this morning when he developed nausea and vomiting secondary to congestion and postnasal drip that gagged him.  While walking patient slipped on some of the vomitus on the floor causing him to fall and hit his left face on dumbbells.  Patient says he did lose consciousness.  No blood thinners.  Currently complains of periorbital pain and forehead pain.  Vision is unchanged.  No neck pain.  No back pain.  Tetanus up-to-date.  Family is also concerned because patient has had lower extremity edema for the last couple of days.  No shortness of breath.  No chest pain    The history is provided by the patient.     Review of patient's allergies indicates:   Allergen Reactions    Poison ivy [vit e-nonoxynol 9-aloe vera]     Venom-honey bee      Past Medical History:   Diagnosis Date    Arthritis     Bursitis     Heart murmur     Hypertension     Onychomycosis 08/23/2023    VIC treated with BiPAP 08/23/2023    Primary osteoarthritis of both knees 06/12/2024     Past Surgical History:   Procedure Laterality Date    APPENDECTOMY      ARTHROSCOPIC REPAIR OF ROTATOR CUFF OF SHOULDER Right 10/21/2022    Procedure: REPAIR, ROTATOR CUFF, ARTHROSCOPIC;  Surgeon: Prince Villatoro MD;  Location: [facility];  Service: Orthopedics;  Laterality: Right;    BACK SURGERY       Family History   Problem Relation Name Age of Onset    Lung cancer Mother      Heart attack Father      Hypertension Father      No Known Problems Sister      Coronary artery disease Brother       Social History[1]  Review of Systems   Constitutional:  Negative for  chills, diaphoresis, fatigue and fever.   HENT:  Positive for congestion and postnasal drip. Negative for drooling, ear discharge, ear pain, rhinorrhea, sinus pressure, sinus pain, sore throat and tinnitus.    Eyes:  Positive for redness. Negative for photophobia, pain, discharge and visual disturbance.   Respiratory:  Positive for cough. Negative for chest tightness, shortness of breath and wheezing.    Cardiovascular:  Negative for chest pain and palpitations.   Gastrointestinal:  Positive for nausea and vomiting. Negative for abdominal pain and diarrhea.   Genitourinary:  Negative for frequency, hematuria and urgency.   Musculoskeletal:  Negative for back pain, neck pain and neck stiffness.   Skin:  Negative for rash.   Neurological:  Negative for syncope, weakness, light-headedness, numbness and headaches.   Psychiatric/Behavioral:  Negative for suicidal ideas.        Physical Exam     Initial Vitals [04/02/25 0851]   BP Pulse Resp Temp SpO2   (!) 165/90 64 18 97.7 °F (36.5 °C) 98 %      MAP       --         Physical Exam    Nursing note and vitals reviewed.  Constitutional: He appears well-developed. No distress.   HENT:   Nose: Nose normal. Mouth/Throat: Oropharynx is clear and moist.   Eyes: EOM are normal. Pupils are equal, round, and reactive to light. Left eye exhibits chemosis. Left conjunctiva is injected.   Neck: Phonation normal.   Cardiovascular:  Normal rate.           Pulmonary/Chest: Breath sounds normal. No respiratory distress.   Abdominal: Abdomen is soft. There is no abdominal tenderness. There is no guarding.   Musculoskeletal:         General: Normal range of motion.      Cervical back: No spinous process tenderness.      Lumbar back: No bony tenderness. Normal range of motion.      Right lower leg: 3+ Edema present.      Left lower leg: 3+ Edema present.     Neurological: He is alert and oriented to person, place, and time. He has normal strength. No cranial nerve deficit or sensory deficit.  GCS score is 15. GCS eye subscore is 4. GCS verbal subscore is 5. GCS motor subscore is 6.   Skin: No rash noted.         ED Course   Lac Repair    Date/Time: 4/2/2025 12:33 PM    Performed by: Jeffery Hancock MD  Authorized by: Jeffery Hancock MD    Consent:     Consent obtained:  Verbal    Consent given by:  Patient  Universal protocol:     Patient identity confirmed:  Provided demographic data  Anesthesia:     Anesthesia method:  Nerve block    Block needle gauge:  25 G    Block anesthetic:  Lidocaine 2% w/o epi    Block injection procedure:  Anatomic landmarks palpated, negative aspiration for blood and introduced needle    Block outcome:  Anesthesia achieved  Laceration details:     Location:  Face    Face location:  L upper eyelid    Extent:  Superficial    Length (cm):  0.5  Pre-procedure details:     Preparation:  Patient was prepped and draped in usual sterile fashion  Exploration:     Contaminated: no    Treatment:     Area cleansed with:  Povidone-iodine    Irrigation solution:  Sterile water and tap water    Debridement:  None  Skin repair:     Repair method:  Sutures    Suture size:  5-0    Suture material:  Plain gut    Suture technique:  Simple interrupted    Number of sutures:  2  Approximation:     Approximation:  Close  Post-procedure details:     Dressing:  Open (no dressing)    Labs Reviewed   COMPREHENSIVE METABOLIC PANEL - Abnormal       Result Value    Sodium 140      Potassium 4.3      Chloride 108 (*)     CO2 26      Glucose 102      Blood Urea Nitrogen 13.6      Creatinine 0.88      Calcium 9.7      Protein Total 7.0      Albumin 3.8      Globulin 3.2      Albumin/Globulin Ratio 1.2      Bilirubin Total 0.7      ALP 88      ALT 17      AST 21      eGFR >60      Anion Gap 6.0      BUN/Creatinine Ratio 15     CBC WITH DIFFERENTIAL - Abnormal    WBC 7.46      RBC 5.05      Hgb 13.4 (*)     Hct 41.4 (*)     MCV 82.0      MCH 26.5 (*)     MCHC 32.4 (*)     RDW 15.8      Platelet 222       MPV 10.5 (*)     Neut % 49.6      Lymph % 32.8      Mono % 13.5      Eos % 3.1      Basophil % 0.7      Imm Grans % 0.3      Neut # 3.70      Lymph # 2.45      Mono # 1.01      Eos # 0.23      Baso # 0.05      Imm Gran # 0.02      NRBC% 0.0     B-TYPE NATRIURETIC PEPTIDE - Normal    Natriuretic Peptide 51.0     TROPONIN I - Normal    Troponin-I <0.010     TSH - Normal    TSH 1.115     MAGNESIUM - Normal    Magnesium Level 2.00     COVID/RSV/FLU A&B PCR - Normal    Influenza A PCR Not Detected      Influenza B PCR Not Detected      Respiratory Syncytial Virus PCR Not Detected      SARS-CoV-2 PCR Not Detected      Narrative:     The Xpert Xpress SARS-CoV-2/FLU/RSV plus is a rapid, multiplexed real-time PCR test intended for the simultaneous qualitative detection and differentiation of SARS-CoV-2, Influenza A, Influenza B, and respiratory syncytial virus (RSV) viral RNA in either nasopharyngeal swab or nasal swab specimens.         CBC W/ AUTO DIFFERENTIAL    Narrative:     The following orders were created for panel order CBC auto differential.  Procedure                               Abnormality         Status                     ---------                               -----------         ------                     CBC with Differential[[id number]]       Abnormal            Final result                 Please view results for these tests on the individual orders.          Imaging Results              X-Ray Chest PA And Lateral (Final result)  Result time 04/02/25 10:38:59      Final result by Agus Zapata MD (04/02/25 10:38:59)                   Impression:      No acute cardiopulmonary abnormality.      Electronically signed by: Agus Zapata  Date:    04/02/2025  Time:    10:38               Narrative:    EXAMINATION:  XR CHEST PA AND LATERAL    CLINICAL HISTORY:  Other specified soft tissue disorders    COMPARISON:  28 March 2025    FINDINGS:  PA and lateral views of the chest were obtained. Heart is not  enlarged.  There is aortic atherosclerosis.  Grossly clear lungs.  No pneumothorax.                                       CT Cervical Spine Without Contrast (Final result)  Result time 04/02/25 09:36:30      Final result by Willie Trimble MD (04/02/25 09:36:30)                   Impression:      Multilevel cervical degeneration as detailed above with no evidence of acute osseous abnormality.      Electronically signed by: Willie rTimble  Date:    04/02/2025  Time:    09:36               Narrative:    EXAMINATION:  CT CERVICAL SPINE WITHOUT CONTRAST    CLINICAL HISTORY:  Neck trauma (Age >= 65y);    TECHNIQUE:  Low dose axial images, sagittal and coronal reformations were performed though the cervical spine. Contrast was not administered. Dose reduction techniques including automatic exposure control (AEC) were utilized.    Dose (DLP): 1545 mGycm    COMPARISON:  CT cervical spine 02/25/2025.    FINDINGS:  No subluxation.  Straightening of the normal cervical lordosis.  No acute fracture.  Moderate to severe multilevel cervical disc degeneration extending from C3-C4 through C6-C7.  Multilevel uncovertebral hypertrophy.  Mild facet joint degeneration noted on the left at C2-C3, C4-C5 and on the right at C7-T1.  Mild multilevel cervical neural foraminal stenosis.  Moderate bilateral foraminal stenosis at C3-C4 and C4-C5 due to uncovertebral hypertrophy.  Mild foraminal stenosis at C2-C3, C5-C6, and C6-C7 due to uncovertebral hypertrophy.  No acute paraspinous soft tissue abnormality.                                       CT Maxillofacial Without Contrast (Final result)  Result time 04/02/25 09:41:42      Final result by Willie Trimble MD (04/02/25 09:41:42)                   Impression:      No acute osseous or soft tissue abnormality.    Air-fluid level in the left maxillary sinus, suggestive of acute sinusitis.    Moderate degeneration of the right TMJ with suggestion of an intra-articular loose body  anteriorly.      Electronically signed by: Willie Trimble  Date:    04/02/2025  Time:    09:41               Narrative:    EXAMINATION:  CT MAXILLOFACIAL WITHOUT CONTRAST    CLINICAL HISTORY:  Facial trauma, blunt;    TECHNIQUE:  Low dose axial images, sagittal and coronal reformations were obtained through the face.  Contrast was not administered. Dose reduction techniques including automatic exposure control (AEC) were utilized.    Dose (DLP): 1545 mGycm    COMPARISON:  CT maxillofacial without contrast 02/25/2025.    FINDINGS:  FACIAL BONES: No acute fracture.  Optic and carotid canals are intact.    SINUSES: Air-fluid level in the left maxillary sinus, suggestive of acute sinusitis    TEMPORAL BONES: Moderate degeneration of the right TMJ within an intra-articular loose body noted anteriorly.  Visualized inner and middle ear structures are intact.  Mastoid air cells are clear.    ORBITS: No mass, hematoma or edema.  Both globes retain their turgor.    OTHER: Visualized nasopharynx, oropharynx, and oral cavity are normal.                                       CT Head Without Contrast (Final result)  Result time 04/02/25 09:26:52      Final result by Isai Mesa MD (04/02/25 09:26:52)                   Impression:      Left periorbital hematoma without underlying fracture or acute intracranial abnormality.      Electronically signed by: Isai Mesa MD  Date:    04/02/2025  Time:    09:26               Narrative:    EXAMINATION:  CT HEAD WITHOUT CONTRAST    CLINICAL HISTORY:  Facial trauma, blunt;    TECHNIQUE:  Axial images of the head were obtained without IV contrast administration.  Coronal and sagittal reconstructions were provided.  Three dimensional and MIP images were obtained and evaluated.  Total DLP was 1545 mGy-cm. Dose lowering technique and automated exposure control were utilized for this exam.    COMPARISON:  Head CT 02/25/2025.    FINDINGS:  There is normal brain formation.  There is normal  gray-white matter differentiation.  There is no hemorrhage, hydrocephalus, or midline shift.  There is no cytotoxic or vasogenic edema.  There is no intra or extra-axial fluid collection.  There is no herniation.    The calvarium is intact.  There is no fracture.  There is a left periorbital hematoma.  The bilateral orbits are otherwise normal.  The paranasal sinuses are normally developed with left maxillary mucoperiosteal thickening.                                       Medications   LIDOcaine (PF) 20 mg/mL (2%) injection 200 mg (200 mg Infiltration Given by Provider 4/2/25 0945)     Medical Decision Making  Medical Decision Making  Problem list/ differential diagnosis including but not limited to:  ICH/SAH, spinal column injury, ruptured globe, traumatic iritis, Septra, hemorrhage, eyelid laceration, facial fracture    Patient's chronic illnesses impacting care:  none    Diagnostic test considered but not ordered:    My interpretations:      Radiology reports      Discussion of case with external qualified healthcare professionals:  Not applicable    Review of external notes( inpt, ems, NH, clinic):      Decision rules/scores:    Medications reviewed:  Medications ordered in the ER:  Discharge prescriptions:  Zofran    Social variables possible impacting patient's healthcare:    Code status/discussion    Shared decision making:    Consideration for admission versus discharge: stable for discharge     Amount and/or Complexity of Data Reviewed  Labs: ordered. Decision-making details documented in ED Course.  Radiology: ordered.    Risk  Prescription drug management.               ED Course as of 04/02/25 1235   Wed Apr 02, 2025   1145 Influenza A, Molecular: Not Detected [WC]   1145 Influenza B, Molecular: Not Detected [WC]   1145 RSV Ag by Molecular Method: Not Detected [WC]   1145 SARS-CoV2 (COVID-19) Qualitative PCR: Not Detected [WC]      ED Course User Index  [WC] Jeffery Hancock MD                            Clinical Impression:  Final diagnoses:  [M79.89] Leg swelling  [S09.90XA] Injury of head, initial encounter (Primary)  [S00.83XA] Contusion of face, initial encounter  [S01.112A] Left eyelid laceration, initial encounter          ED Disposition Condition    Discharge Stable          ED Prescriptions       Medication Sig Dispense Start Date End Date Auth. Provider    ondansetron (ZOFRAN-ODT) 4 MG TbDL (Expires today) Take 1 tablet (4 mg total) by mouth once. for 1 dose 1 tablet 4/2/2025 4/2/2025 Jeffery Hancock MD          Follow-up Information       Follow up With Specialties Details Why Contact Info    Aniya Irwin MD Family Medicine In 1 week  [address] [place] [address]  [address] [place] [id number]  [phone]               Jeffery Hancock MD  04/02/25 1206         [1]   Social History  Tobacco Use    Smoking status: Never    Smokeless tobacco: Never   Substance Use Topics    Alcohol use: Yes     Comment: Socially    Drug use: Never        Jeffery Hancock MD  04/02/25 1234

## 2025-04-08 ENCOUNTER — TELEPHONE (OUTPATIENT)
Facility: CLINIC | Age: 70
End: 2025-04-08
Payer: COMMERCIAL
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
## 2025-05-21 ENCOUNTER — DOCUMENTATION ONLY (OUTPATIENT)
Dept: FAMILY MEDICINE | Facility: CLINIC | Age: 70
End: 2025-05-21
Payer: COMMERCIAL

## 2025-06-25 ENCOUNTER — OFFICE VISIT (OUTPATIENT)
Dept: FAMILY MEDICINE | Facility: CLINIC | Age: 70
End: 2025-06-25
Payer: COMMERCIAL

## 2025-06-25 ENCOUNTER — HOSPITAL ENCOUNTER (OUTPATIENT)
Dept: RADIOLOGY | Facility: HOSPITAL | Age: 70
Discharge: HOME | End: 2025-06-25
Attending: FAMILY MEDICINE
Payer: COMMERCIAL

## 2025-06-25 VITALS
DIASTOLIC BLOOD PRESSURE: 80 MMHG | SYSTOLIC BLOOD PRESSURE: 150 MMHG | RESPIRATION RATE: 16 BRPM | WEIGHT: 230.81 LBS | BODY MASS INDEX: 43.58 KG/M2 | HEART RATE: 69 BPM | TEMPERATURE: 98 F | HEIGHT: 61 IN | OXYGEN SATURATION: 97 %

## 2025-06-25 DIAGNOSIS — S22.20XS CLOSED FRACTURE OF STERNUM, UNSPECIFIED PORTION OF STERNUM, SEQUELA: ICD-10-CM

## 2025-06-25 DIAGNOSIS — I10 PRIMARY HYPERTENSION: Primary | ICD-10-CM

## 2025-06-25 PROCEDURE — 3079F DIAST BP 80-89 MM HG: CPT | Mod: CPTII,,, | Performed by: FAMILY MEDICINE

## 2025-06-25 PROCEDURE — 3077F SYST BP >= 140 MM HG: CPT | Mod: CPTII,,, | Performed by: FAMILY MEDICINE

## 2025-06-25 PROCEDURE — 3008F BODY MASS INDEX DOCD: CPT | Mod: CPTII,,, | Performed by: FAMILY MEDICINE

## 2025-06-25 PROCEDURE — 99214 OFFICE O/P EST MOD 30 MIN: CPT | Mod: ,,, | Performed by: FAMILY MEDICINE

## 2025-06-25 PROCEDURE — 1160F RVW MEDS BY RX/DR IN RCRD: CPT | Mod: CPTII,,, | Performed by: FAMILY MEDICINE

## 2025-06-25 PROCEDURE — 3288F FALL RISK ASSESSMENT DOCD: CPT | Mod: CPTII,,, | Performed by: FAMILY MEDICINE

## 2025-06-25 PROCEDURE — 3044F HG A1C LEVEL LT 7.0%: CPT | Mod: CPTII,,, | Performed by: FAMILY MEDICINE

## 2025-06-25 PROCEDURE — 1159F MED LIST DOCD IN RCRD: CPT | Mod: CPTII,,, | Performed by: FAMILY MEDICINE

## 2025-06-25 PROCEDURE — 1101F PT FALLS ASSESS-DOCD LE1/YR: CPT | Mod: CPTII,,, | Performed by: FAMILY MEDICINE

## 2025-06-25 PROCEDURE — 1126F AMNT PAIN NOTED NONE PRSNT: CPT | Mod: CPTII,,, | Performed by: FAMILY MEDICINE

## 2025-06-25 PROCEDURE — 4010F ACE/ARB THERAPY RXD/TAKEN: CPT | Mod: CPTII,,, | Performed by: FAMILY MEDICINE

## 2025-06-25 PROCEDURE — 71046 X-RAY EXAM CHEST 2 VIEWS: CPT | Mod: TC
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
## 2025-06-25 NOTE — ASSESSMENT & PLAN NOTE
Initial reading elevated. Repeat improved but not to goal. Reports compliance with meds. Monitor bp at home.   taking lisinopril 40mg, norvasc 10mg, and lasix 10mg daily due to report of LE edema. Wear compression socks.  Monitor bp at home. Continue on current meds.      Contact clinic for any concerns. Patient is agreeable to plan and verbalized understanding.

## 2025-06-27 ENCOUNTER — RESULTS FOLLOW-UP (OUTPATIENT)
Dept: FAMILY MEDICINE | Facility: CLINIC | Age: 70
End: 2025-06-27

## 2025-06-27 ENCOUNTER — TELEPHONE (OUTPATIENT)
Dept: FAMILY MEDICINE | Facility: CLINIC | Age: 70
End: 2025-06-27
Payer: COMMERCIAL

## 2025-06-27 ENCOUNTER — PATIENT MESSAGE (OUTPATIENT)
Dept: FAMILY MEDICINE | Facility: CLINIC | Age: 70
End: 2025-06-27
Payer: COMMERCIAL
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
## 2025-06-27 NOTE — TELEPHONE ENCOUNTER
Copied from CRM #[id number]. Topic: General Inquiry - Patient Advice  >> Jun 27, 2025  9:38 AM Manuela wrote:  .Who Called: Reidalison PHAN Kang    Patient is returning phone call    Who Left Message for Patient:  Does the patient know what this is regarding?:Pt need callback regarding x-ray results. Stated he needs results to get release for work        Preferred Method of Contact: Phone Call  Patient's Preferred Phone Number on File: [phone]   Best Call Back Number, if different:  Additional Information:

## 2025-06-30 ENCOUNTER — TELEPHONE (OUTPATIENT)
Dept: FAMILY MEDICINE | Facility: CLINIC | Age: 70
End: 2025-06-30
Payer: COMMERCIAL

## 2025-06-30 NOTE — TELEPHONE ENCOUNTER
Copied from CRM #[id number]. Topic: General Inquiry - Test Results  >> Jun 30, 2025 10:40 AM Sharif wrote:  Who Called: Arsenio Kang    Caller is requesting information on test results.    Name of Test (Lab/Mammo/Etc): x ray  Date of Test: 06/25  Where the test was performed:  Ordering Provider:     Patient's Preferred Phone Number on File: [phone]   Best Call Back Number, if different:    Additional Information: pt called to discuss xray results, pt stated he needs a call back today , so he can inform his employer if he can return to work.

## 2025-06-30 NOTE — ASSESSMENT & PLAN NOTE
Repeat cxr obtained.  Will call with results when available and write appropriate letter as requested.  Patient denies any chest pain or shortness of breath.  Needs excuse to return to work.  Feels ready to return.  Has been out of work since [facility] 2/2025.       ER precautions discussed-severe SOB or chest pain.   Patient verbalizes understanding.

## 2025-06-30 NOTE — TELEPHONE ENCOUNTER
Results reviewed, work release faxed as requested and copy placed up front for patient to  as requested       [facility] service fax: [phone]

## 2025-07-29 ENCOUNTER — TELEPHONE (OUTPATIENT)
Dept: FAMILY MEDICINE | Facility: CLINIC | Age: 70
End: 2025-07-29
Payer: COMMERCIAL

## 2025-07-29 NOTE — TELEPHONE ENCOUNTER
"I received email from Viky Nichols regarding patient with message that read:    "I am reaching out to you for some assistance with the requested diagnosis for the Xray completed on 6/25/25. Sequela code/s cannot be used as primary/alone. Would you be able to get Dr Irwin to add a valid primary diagnosis on the orders so we can complete this account? I see where you uploaded the code change request in Epic; however, there were no added diagnosis code/s."    Please advise, thanks        "

## (undated) DEVICE — KIT TRIMANO

## (undated) DEVICE — GLOVE 7.0 PROTEXIS PI BLUE

## (undated) DEVICE — Device

## (undated) DEVICE — DRAPE STERI U-SHAPED 47X51IN

## (undated) DEVICE — TUBE SUCTION MEDI-VAC STERILE

## (undated) DEVICE — CANNULA PASSPORT 8 MM X 4CM.

## (undated) DEVICE — DRAPE INCISE IOBAN 2 23X23IN

## (undated) DEVICE — SET CASSETTE TUBE DW OUTFLOW

## (undated) DEVICE — SUT 3-0 MONOCRYL PLUS PS-2

## (undated) DEVICE — GLOVE PROTEXIS HYDROGEL SZ7

## (undated) DEVICE — COVER MAYO STAND REINFRCD 30

## (undated) DEVICE — TAPE SILK 3IN

## (undated) DEVICE — NDL ANES SPINAL 18X3.5ST 18G

## (undated) DEVICE — GAUZE SPONGE 4X4 12 PLY NS

## (undated) DEVICE — ELECTRODE PATIENT RETURN DISP

## (undated) DEVICE — SUT FIBERWIRE

## (undated) DEVICE — NDL SUREFIRE SCORPION RC

## (undated) DEVICE — CLOSURE SKIN STERI STRIP 1/2X4

## (undated) DEVICE — KIT SURGICAL TURNOVER

## (undated) DEVICE — GAUZE SPONGE 4X4 12PLY

## (undated) DEVICE — CUBE COLD THERAPY POLAR PAD

## (undated) DEVICE — DRAPE SHOULDER BEACH CHAIR

## (undated) DEVICE — SYR 50CC LL

## (undated) DEVICE — PROBE MULTI PORT RF 90 DEGREE

## (undated) DEVICE — SOL NACL IRR 3000ML

## (undated) DEVICE — BLADE COOLCUT EXCALIBER 4X13

## (undated) DEVICE — COVER FULLGUARD SHOE HIGH-TOP

## (undated) DEVICE — TUBING PUMP ARTHROSCOPY STRL

## (undated) DEVICE — TAPE ADH MEDIPORE 4 X 10YDS

## (undated) DEVICE — BLADE SURG CARBON STEEL SZ11

## (undated) DEVICE — APPLICATOR CHLORAPREP ORN 26ML

## (undated) DEVICE — PAD ABD 8X10 STERILE